# Patient Record
Sex: FEMALE | Race: WHITE | NOT HISPANIC OR LATINO | URBAN - METROPOLITAN AREA
[De-identification: names, ages, dates, MRNs, and addresses within clinical notes are randomized per-mention and may not be internally consistent; named-entity substitution may affect disease eponyms.]

---

## 2023-06-23 ASSESSMENT — ENCOUNTER SYMPTOMS
SORE THROAT: 0
NECK PAIN: 1
DIARRHEA: 0
VOMITING: 0
RHINORRHEA: 1
ABDOMINAL PAIN: 0
COUGH: 1
HEADACHES: 1

## 2023-06-25 PROBLEM — F41.9 ANXIETY: Status: ACTIVE | Noted: 2023-06-25

## 2023-06-25 PROBLEM — H92.09 OTALGIA, UNSPECIFIED EAR: Status: ACTIVE | Noted: 2023-06-25

## 2023-06-25 PROBLEM — N20.0 NEPHROLITHIASIS: Status: ACTIVE | Noted: 2023-06-25

## 2023-06-25 PROBLEM — J31.0 CHRONIC RHINITIS: Status: ACTIVE | Noted: 2023-06-25

## 2023-06-25 PROBLEM — R73.03 PREDIABETES: Status: ACTIVE | Noted: 2023-06-25

## 2023-06-25 PROBLEM — I10 HYPERTENSION, ESSENTIAL: Status: ACTIVE | Noted: 2023-06-25

## 2023-06-25 PROBLEM — F41.8 DEPRESSION WITH ANXIETY: Status: ACTIVE | Noted: 2023-06-25

## 2023-06-25 PROBLEM — R60.0 EDEMA OF BOTH LEGS: Status: ACTIVE | Noted: 2023-06-25

## 2023-06-25 PROBLEM — E78.00 HYPERCHOLESTEROLEMIA: Status: ACTIVE | Noted: 2023-06-25

## 2023-06-25 RX ORDER — HYDROCHLOROTHIAZIDE 12.5 MG/1
12.5 CAPSULE ORAL EVERY MORNING
COMMUNITY
Start: 2023-04-06

## 2023-06-25 RX ORDER — ALBUTEROL SULFATE 90 UG/1
2 AEROSOL, METERED RESPIRATORY (INHALATION) EVERY 6 HOURS PRN
COMMUNITY
Start: 2023-03-30 | End: 2023-08-31 | Stop reason: ALTCHOICE

## 2023-06-25 RX ORDER — CHOLECALCIFEROL (VITAMIN D3) 50 MCG
2000 TABLET ORAL DAILY
COMMUNITY
Start: 2013-07-31

## 2023-06-25 RX ORDER — CETIRIZINE HYDROCHLORIDE 10 MG/1
10 TABLET ORAL DAILY
COMMUNITY
Start: 2021-09-01 | End: 2024-03-20 | Stop reason: ALTCHOICE

## 2023-06-25 RX ORDER — ESCITALOPRAM OXALATE 5 MG/1
5 TABLET ORAL DAILY
COMMUNITY
Start: 2022-09-09 | End: 2023-06-26 | Stop reason: ALTCHOICE

## 2023-06-25 ASSESSMENT — ENCOUNTER SYMPTOMS
VOMITING: 0
RHINORRHEA: 1
SORE THROAT: 0
DIARRHEA: 0
COUGH: 1
HEADACHES: 1
ABDOMINAL PAIN: 0
NECK PAIN: 1

## 2023-06-25 NOTE — PROGRESS NOTES
Subjective   Patient ID: Amanda Garcia is a 77 y.o. female who presents for right ear pain    Earache   There is pain in both ears. This is a new problem. The current episode started more than 1 month ago. The problem has been waxing and waning. There has been no fever. The pain is at a severity of 8/10. Associated symptoms include coughing, headaches, neck pain and rhinorrhea. Pertinent negatives include no abdominal pain, diarrhea, ear discharge, hearing loss, rash, sore throat or vomiting.   The patient reports a history of constant aching pain in the right ear region over the past several months.  She reports constant itching and swelling in the external part of the right ear as well.  She reports no exacerbating or relieving factors.  She reports that soon after the onset of ear pain she developed constant soreness along the right side of the neck.  She reports an increase in the intensity of the soreness with any movement.  She also reports that soon after she developed the constant aching pain in the right ear she developed intermittent episodes of aching pain in the right frontotemporal region.  She reports that the duration of each episode has been hours.  She reports no precipitating, exacerbating, relieving factors.  She reports no associated visual changes, slurred speech, focal weakness/paresthesias, nausea, vomiting, photophobia, photophobia.    The patient does report a history of clear rhinorrhea from the right nostril over the past several months.  She also reports a history of intermittent momentary episodes of a sensation of being off balance over the past several months.  She reports that the episodes occur when she moves quickly from lying to sitting or sitting to standing she reports no other associated symptoms.    The patient reports no change in the aforementioned symptoms despite completing a 5-day course of doxycycline last week which had been prescribed by a nurse practitioner at an  urgent care center    Review of Systems   HENT:  Positive for ear pain and rhinorrhea. Negative for ear discharge, hearing loss and sore throat.    Respiratory:  Positive for cough.    Gastrointestinal:  Negative for abdominal pain, diarrhea and vomiting.   Musculoskeletal:  Positive for neck pain.   Skin:  Negative for rash.   Neurological:  Positive for headaches.       Objective   There were no vitals taken for this visit.    Physical Exam  Head-palpation revealed no tenderness over the maxillary or frontal sinuses  Eyes-extraocular movements intact pupils equal and reactive to light fundi revealed good retinal color no hemorrhages or exudates  Ears-palpation ofpinnas and tragusesrevealed no tenderness. External auditory canals not erythematous or swollen. TMs clear.      Nose-turbinates not erythematous or swollen no septal deviation noted  Mouth-posterior pharynx is not erythematous or swollen. Tonsillar pillars appeared normal no exudates  Neck no lymphadenopathy. Thyroid gland not enlarged. , No bruits  Lungs-clear to auscultation bilaterally  Cardiac-rate normal rhythm regular no murmurs no JVD  Extremities-no peripheral edema  Musculoskeletal  Cervical spine-no erythema or swelling.  Full range of motion with increased intensity of soreness in all directions of motion.  Palpation did reveal tenderness just below the right TMJ, no increase in warmth  Neurologic  Mental status-alert and oriented x3   Cranial nerves-2 through 12 grossly intact, no visual field abnormalities  Motor-no pronator drift noted, strength-5/5 in all muscle groups tested, , no tremor noted.  No bradykinesia noted.  No rigidity noted.  Negative pull test  Sensory-Light touch sensation fully intact  Pinprick sensation fully intact  Vibratory sensation fully intact  Cerebellar-no truncal ataxia, good coordination finger-nose testing,, good coordination heel-to-shin testing, normal rapid alternating movements  Romberg negative, no  abnormality in tandem gait  Reflexes-1+/4 bilaterally    [place]-Hallpike negative with rotation of the head bilaterally  Assessment/Plan        Assessment  Constant aching pain in the right ear-May be secondary to right TMJ syndrome, osteoarthritis and degenerative disc disease of the cervical spine, eustachian tube dysfunction.  Rhinorrhea from the right nostril-May be secondary to nonallergic rhinitis, allergic rhinitis, structural abnormality  Frequent episodes of aching pain in the right frontotemporal region-May be secondary to TMJ syndrome, osteoarthritis and degenerative disc disease of the cervical spine  Constant soreness located along the right side of the neck-may be secondary to osteoarthritis and degenerative disc disease of the cervical spine  Plan  Obtain MRI/MRI of the brain and internal auditory canals as soon as possible.  Obtain MRI of the cervical spine as soon as possible.  I did encourage the patient to use Flonase spray 1 spray into the right nostril twice daily and to continue use of Zyrtec 10 mg p.o. nightly.  I also recommended that she begin use of celecoxib 100 mg p.o. twice daily as needed pain.  The patient may require an ENT referral and/or physical therapy referral

## 2023-06-26 ENCOUNTER — OFFICE VISIT (OUTPATIENT)
Dept: PRIMARY CARE | Facility: CLINIC | Age: 78
End: 2023-06-26
Payer: MEDICARE

## 2023-06-26 VITALS
BODY MASS INDEX: 23.44 KG/M2 | WEIGHT: 139.8 LBS | SYSTOLIC BLOOD PRESSURE: 130 MMHG | HEART RATE: 96 BPM | DIASTOLIC BLOOD PRESSURE: 76 MMHG

## 2023-06-26 DIAGNOSIS — H92.01 RIGHT EAR PAIN: Primary | ICD-10-CM

## 2023-06-26 DIAGNOSIS — M54.2 NECK PAIN: ICD-10-CM

## 2023-06-26 PROCEDURE — 3075F SYST BP GE 130 - 139MM HG: CPT | Performed by: INTERNAL MEDICINE

## 2023-06-26 PROCEDURE — 1159F MED LIST DOCD IN RCRD: CPT | Performed by: INTERNAL MEDICINE

## 2023-06-26 PROCEDURE — 3078F DIAST BP <80 MM HG: CPT | Performed by: INTERNAL MEDICINE

## 2023-06-26 PROCEDURE — 99213 OFFICE O/P EST LOW 20 MIN: CPT | Performed by: INTERNAL MEDICINE

## 2023-06-26 PROCEDURE — 1160F RVW MEDS BY RX/DR IN RCRD: CPT | Performed by: INTERNAL MEDICINE

## 2023-06-26 RX ORDER — CELECOXIB 100 MG/1
100 CAPSULE ORAL 2 TIMES DAILY
Qty: 60 CAPSULE | Refills: 2 | Status: SHIPPED | OUTPATIENT
Start: 2023-06-26 | End: 2023-08-31 | Stop reason: ALTCHOICE

## 2023-06-26 RX ORDER — CELECOXIB 100 MG/1
100 CAPSULE ORAL 2 TIMES DAILY
COMMUNITY
End: 2023-06-26 | Stop reason: SDUPTHER

## 2023-09-01 NOTE — PROGRESS NOTES
Subjective   Reason for Visit: Amanda Garcia is an 77 y.o. female here for a Medicare Wellness visit.               HPI  The patient reports a history of frequent episodes of soreness extending from the midportion of the right upper arm to the proximal end of the right forearm over the past 2 months.  She reports that the episodes are precipitated by increased use of the arm.  She reports no preceding trauma/overexertion.  No other associated symptoms.    Over the past year, the patient reports experiencing dyspnea with more activities.  Not only does she experience dyspnea when walking up and down stairs but also when cleaning.  She still reports continued chronic intermittent brief episodes of aching pain in the left upper chest region times years.  She reports that the episodes occur during periods of stress and are a few minutes in duration.  She reports that she does not experience the episodes with activity.  She reports that the episodes have not been affected by oral intake or movement.  She reports no radiation of discomfort and no other associated symptoms.  She reports no change in the frequency, duration, or intensity of the episodes over the past year    The patient reports a long history of intermittent episodes of a nonproductive cough.  She reports a long history of intermittent episodes of postnasal drip times years.  She reports that the cough often can occur in the absence of postnasal drip.  She reports no other associated symptoms.    She reports no episodes of nausea since she stopped drinking coffee approximately 1 year ago.    Over the past year, she reports experiencing intermittent episodes of soreness in the left groin region only when she sits  style.  She reports no radiation of discomfort.  She reports no other associated symptoms.  She reports no episodes of pain on the right side of the neck, no episodes of pain in the right frontal temporal region, and no episodes of pain in  the right ear over the past 1-1.5 months since she has been performing exercises given to her in physical therapy.    The patient does report that her energy level was good up until the past few weeks.  He has felt more fatigued over the past few weeks.  She reports no depressed mood, no anxiety, no irritability, no decrease in concentration over the past year or so.  No other new complaints.  Patient Care Team:  Scotty Flores MD as PCP - General  Scotty Flores MD as PCP - [facility] Attributed Provider     Review of Systems  All systems have been reviewed and are normal except as previously noted  Objective   Vitals:  There were no vitals taken for this visit.      Physical Exam  Head - palpation revealed no tenderness over the maxillary or frontal sinuses.   Eyes - extraocular movements intact, pupils equal and reactive to light, fundi revealed good retinal color, no hemorrhages or exudates  Ears -palpation of the pinnas and traguses revealed no tenderness. External auditory canals are narrow but not erythematous or swollen. TMs clear  Nose - turbinates not erythematous or swollen; no septal deviation noted.  Mouth - posterior pharynx is not erythematous or swollen. Tonsillar pillars appeared normal; no exudates.  Neck - no lymphadenopathy. Thyroid gland not enlarged. No bruits.  Breasts - Deferred. Mammogram performed August 23, 2023  Lungs - clear to auscultation bilaterally.  Cardiac - rate normal, rhythm regular, no murmurs, no JVD.  Abdomen - cholecystectomy scar noted. Soft, nondistended, normal active bowel sounds. Palpation revealed no tenderness or masses.  Pulses - 2+ bilaterally except dorsalis pedis pulses in both feet-0  Extremities - no peripheral edema.  Musculoskeletal:  Chest - No erythema or swelling. Full range of motion in all directions of motion with no pain. Palpation revealed no tenderness or increase in warmth.  Right shoulder-no erythema or swelling.?  Positive Miguel sign.  Full range of  motion in all directions of motion with no pain.  Palpation revealed no tenderness or increase in warmth.  Slightly positive Neer's sign, slightly positive impingement sign, negative Arnett sign, negative arm crossover test, negative empty can test, negative Yergason sign  Left hip-no erythema or swelling. Windshield wiper test negative. Full range of motion in all directions of motion with no pain. Palpation revealed no tenderness or increase in warmth    Neurologic  Mental status-alert and oriented x3   Cranial nerves-2 through 12 grossly intact, no visual field abnormalities  Motor-no pronator drift noted, strength-5/5 in all muscle groups tested, , no tremor noted.  No bradykinesia noted.  No rigidity noted.  Negative pull test  Sensory-Light touch sensation fully intact  Pinprick sensation fully intact  Vibratory sensation fully intact  Cerebellar-no truncal ataxia, good coordination finger-nose testing,, good coordination heel-to-shin testing, normal rapid alternating movements  Romberg negative, no abnormality in tandem gait  Reflexes -ankle jerks 1+/4 bilaterally; left upper extremity 1+/4, all other reflexes tested 2+/4 bilaterally  Assessment/Plan   Problem List Items Addressed This Visit    None    Assessment.  Chronic intermittent episodes of aching pain in the left upper chest region-may be secondary to anxiety, neuromuscular chest discomfort  Noncardiac chest pain  Chronic intermittent episodes of bilateral LE edema - unsure of etiology. May be secondary to lymphedema  Atherosclerotic calcifications in the aorta  Hypertension- blood pressure at goal today.  2 mm nodule left lower lobe..  Chronic dyspnea on exertion-occurring with more activities over the past year-May be secondary to lack of conditioning.  Systolic and/or diastolic dysfunction I suppose are possible etiologies.  Obstructive lung disease is a possible etiology.  COVID-19 September 12, 2022.  Long history of nonproductive  cough-intermittent-unsure of etiology.  May be secondary to upper airway cough syndrome secondary to allergic rhinitis,  Long history of intermittent episodes of postnasal drip-May be secondary to structural abnormality, allergic rhinitis  Gastroesophageal reflux.  Chronic variability in the frequency of bowel movements-probably secondary to IBS-C  Chronic variability in consistency and shape of stools, increased passage of flatus after eating certain foods-may be secondary to IBS-C  Diverticulosis.  Hemorrhoids  Status post cholecystectomy-remote  Nephrolithiasis-recurrent.  Hypercalciuria  Chronic intermittent episodes of urinary urgency-may be secondary to overactive bladder  Overactive bladder  Status post total abdominal hysterectomy/bilateral salpingo-oophorectomy.  Pelvic organ prolapse  Fracture of right second toe, fracture of left third toe-remote frequent episodes of soreness extending from the midportion of the right upper arm to the proximal end of the right forearm-May be secondary to biceps tendon tear.  Intermittent episodes of soreness in the left groin region-May be secondary to muscle strain, osteoarthritis of the left hip  Plantar fasciitis right heel  Shinsplints right lower leg.  Iritis left eye-recurrent  Bilateral cataracts  Vitreous degeneration L eye.   Dry eye syndrome  Benign keratosis superior end of the left pinna-  Migraine headaches.  Small vessel ischemic changes  Mild central canal stenosis and mild bilateral neuroforaminal stenosis L4-L5  Right-sided neuroforaminal stenosis C6-C7.  Osteoarthritis and degenerative disc disease of the cervical spine  Recent history of generalized fatigue-unsure of etiology.  Anemia and thyroid disease must be ruled out.  Chronic anorexia-probably secondary to depression and anxiety  Depression  Anxiety        Plan  Obtain EKG, urinalysis, CBC differential, CMP, cardiac CRP, fasting lipid profile, vitamin D level, vitamin B 12 level, hemoglobin A1c,  TSH today..  Obtain pulse ox on room air and chest x-ray as well today.  I have recommended that the patient receive 2 doses of Shingrix over the next 12 months.  I asked the patient to begin use of Tylenol 1000 mg p.o. every 6 hours as needed for the moment her episodes of tightness in the central chest region.  I told the patient that she should continue use of Zyrtec but also should use saline spray 2 sprays to each nostril 2-3 times per day.  I told the patient to begin align 4 mg p.o. daily    Patient will return for an annual Medicare wellness visit in 1 year

## 2023-09-05 ENCOUNTER — OFFICE VISIT (OUTPATIENT)
Dept: PRIMARY CARE | Facility: CLINIC | Age: 78
End: 2023-09-05
Payer: MEDICARE

## 2023-09-05 ENCOUNTER — LAB (OUTPATIENT)
Dept: LAB | Facility: LAB | Age: 78
End: 2023-09-05
Payer: MEDICARE

## 2023-09-05 VITALS
HEART RATE: 74 BPM | BODY MASS INDEX: 23.48 KG/M2 | SYSTOLIC BLOOD PRESSURE: 98 MMHG | WEIGHT: 140 LBS | DIASTOLIC BLOOD PRESSURE: 68 MMHG

## 2023-09-05 DIAGNOSIS — E55.9 VITAMIN D DEFICIENCY: ICD-10-CM

## 2023-09-05 DIAGNOSIS — E78.00 HYPERCHOLESTEROLEMIA: ICD-10-CM

## 2023-09-05 DIAGNOSIS — M85.851 OSTEOPENIA OF FEMORAL NECK, BILATERAL: ICD-10-CM

## 2023-09-05 DIAGNOSIS — R73.03 PREDIABETES: ICD-10-CM

## 2023-09-05 DIAGNOSIS — I10 HYPERTENSION, ESSENTIAL: ICD-10-CM

## 2023-09-05 DIAGNOSIS — N20.0 NEPHROLITHIASIS: ICD-10-CM

## 2023-09-05 DIAGNOSIS — J31.0 CHRONIC RHINITIS: ICD-10-CM

## 2023-09-05 DIAGNOSIS — J31.0 CHRONIC RHINITIS: Primary | ICD-10-CM

## 2023-09-05 DIAGNOSIS — R06.09 DOE (DYSPNEA ON EXERTION): ICD-10-CM

## 2023-09-05 DIAGNOSIS — M85.852 OSTEOPENIA OF FEMORAL NECK, BILATERAL: ICD-10-CM

## 2023-09-05 DIAGNOSIS — Z00.00 ROUTINE GENERAL MEDICAL EXAMINATION AT HEALTH CARE FACILITY: ICD-10-CM

## 2023-09-05 LAB
ALANINE AMINOTRANSFERASE (SGPT) (U/L) IN SER/PLAS: 13 U/L (ref 7–45)
ALBUMIN (G/DL) IN SER/PLAS: 4.4 G/DL (ref 3.4–5)
ALKALINE PHOSPHATASE (U/L) IN SER/PLAS: 66 U/L (ref 33–136)
ANION GAP IN SER/PLAS: 14 MMOL/L (ref 10–20)
ASPARTATE AMINOTRANSFERASE (SGOT) (U/L) IN SER/PLAS: 22 U/L (ref 9–39)
BASOPHILS (10*3/UL) IN BLOOD BY AUTOMATED COUNT: 0.06 X10E9/L (ref 0–0.1)
BASOPHILS/100 LEUKOCYTES IN BLOOD BY AUTOMATED COUNT: 0.9 % (ref 0–2)
BILIRUBIN TOTAL (MG/DL) IN SER/PLAS: 0.7 MG/DL (ref 0–1.2)
C REACTIVE PROTEIN (MG/L) IN SER/PLAS BY HIGH SENSIT: 2.3 MG/L
CALCIDIOL (25 OH VITAMIN D3) (NG/ML) IN SER/PLAS: 53 NG/ML
CALCIUM (MG/DL) IN SER/PLAS: 10 MG/DL (ref 8.6–10.6)
CARBON DIOXIDE, TOTAL (MMOL/L) IN SER/PLAS: 27 MMOL/L (ref 21–32)
CHLORIDE (MMOL/L) IN SER/PLAS: 104 MMOL/L (ref 98–107)
CHOLESTEROL (MG/DL) IN SER/PLAS: 185 MG/DL (ref 0–199)
CHOLESTEROL IN HDL (MG/DL) IN SER/PLAS: 63.9 MG/DL
CHOLESTEROL/HDL RATIO: 2.9
COBALAMIN (VITAMIN B12) (PG/ML) IN SER/PLAS: 419 PG/ML (ref 211–911)
CREATININE (MG/DL) IN SER/PLAS: 0.68 MG/DL (ref 0.5–1.05)
EOSINOPHILS (10*3/UL) IN BLOOD BY AUTOMATED COUNT: 0.15 X10E9/L (ref 0–0.4)
EOSINOPHILS/100 LEUKOCYTES IN BLOOD BY AUTOMATED COUNT: 2.3 % (ref 0–6)
ERYTHROCYTE DISTRIBUTION WIDTH (RATIO) BY AUTOMATED COUNT: 11.8 % (ref 11.5–14.5)
ERYTHROCYTE MEAN CORPUSCULAR HEMOGLOBIN CONCENTRATION (G/DL) BY AUTOMATED: 34.5 G/DL (ref 32–36)
ERYTHROCYTE MEAN CORPUSCULAR VOLUME (FL) BY AUTOMATED COUNT: 98 FL (ref 80–100)
ERYTHROCYTES (10*6/UL) IN BLOOD BY AUTOMATED COUNT: 4.45 X10E12/L (ref 4–5.2)
GFR FEMALE: 90 ML/MIN/1.73M2
GLUCOSE (MG/DL) IN SER/PLAS: 95 MG/DL (ref 74–99)
HEMATOCRIT (%) IN BLOOD BY AUTOMATED COUNT: 43.5 % (ref 36–46)
HEMOGLOBIN (G/DL) IN BLOOD: 15 G/DL (ref 12–16)
IMMATURE GRANULOCYTES/100 LEUKOCYTES IN BLOOD BY AUTOMATED COUNT: 0.2 % (ref 0–0.9)
LDL: 107 MG/DL (ref 0–99)
LEUKOCYTES (10*3/UL) IN BLOOD BY AUTOMATED COUNT: 6.4 X10E9/L (ref 4.4–11.3)
LYMPHOCYTES (10*3/UL) IN BLOOD BY AUTOMATED COUNT: 3.32 X10E9/L (ref 0.8–3)
LYMPHOCYTES/100 LEUKOCYTES IN BLOOD BY AUTOMATED COUNT: 51.8 % (ref 13–44)
MONOCYTES (10*3/UL) IN BLOOD BY AUTOMATED COUNT: 0.48 X10E9/L (ref 0.05–0.8)
MONOCYTES/100 LEUKOCYTES IN BLOOD BY AUTOMATED COUNT: 7.5 % (ref 2–10)
NEUTROPHILS (10*3/UL) IN BLOOD BY AUTOMATED COUNT: 2.39 X10E9/L (ref 1.6–5.5)
NEUTROPHILS/100 LEUKOCYTES IN BLOOD BY AUTOMATED COUNT: 37.3 % (ref 40–80)
NRBC (PER 100 WBCS) BY AUTOMATED COUNT: 0 /100 WBC (ref 0–0)
PLATELETS (10*3/UL) IN BLOOD AUTOMATED COUNT: 257 X10E9/L (ref 150–450)
POTASSIUM (MMOL/L) IN SER/PLAS: 3.9 MMOL/L (ref 3.5–5.3)
PROTEIN TOTAL: 7 G/DL (ref 6.4–8.2)
SODIUM (MMOL/L) IN SER/PLAS: 141 MMOL/L (ref 136–145)
THYROTROPIN (MIU/L) IN SER/PLAS BY DETECTION LIMIT <= 0.05 MIU/L: 0.97 MIU/L (ref 0.44–3.98)
TRIGLYCERIDE (MG/DL) IN SER/PLAS: 73 MG/DL (ref 0–149)
UREA NITROGEN (MG/DL) IN SER/PLAS: 16 MG/DL (ref 6–23)
VLDL: 15 MG/DL (ref 0–40)

## 2023-09-05 PROCEDURE — 1159F MED LIST DOCD IN RCRD: CPT | Performed by: INTERNAL MEDICINE

## 2023-09-05 PROCEDURE — 36415 COLL VENOUS BLD VENIPUNCTURE: CPT

## 2023-09-05 PROCEDURE — 86141 C-REACTIVE PROTEIN HS: CPT

## 2023-09-05 PROCEDURE — 84443 ASSAY THYROID STIM HORMONE: CPT

## 2023-09-05 PROCEDURE — 3078F DIAST BP <80 MM HG: CPT | Performed by: INTERNAL MEDICINE

## 2023-09-05 PROCEDURE — 82306 VITAMIN D 25 HYDROXY: CPT

## 2023-09-05 PROCEDURE — 82607 VITAMIN B-12: CPT

## 2023-09-05 PROCEDURE — 1160F RVW MEDS BY RX/DR IN RCRD: CPT | Performed by: INTERNAL MEDICINE

## 2023-09-05 PROCEDURE — 85025 COMPLETE CBC W/AUTO DIFF WBC: CPT

## 2023-09-05 PROCEDURE — 1170F FXNL STATUS ASSESSED: CPT | Performed by: INTERNAL MEDICINE

## 2023-09-05 PROCEDURE — 80061 LIPID PANEL: CPT

## 2023-09-05 PROCEDURE — 3074F SYST BP LT 130 MM HG: CPT | Performed by: INTERNAL MEDICINE

## 2023-09-05 PROCEDURE — 80053 COMPREHEN METABOLIC PANEL: CPT

## 2023-09-05 PROCEDURE — 99215 OFFICE O/P EST HI 40 MIN: CPT | Performed by: INTERNAL MEDICINE

## 2023-09-05 ASSESSMENT — ACTIVITIES OF DAILY LIVING (ADL)
DOING_HOUSEWORK: INDEPENDENT
MANAGING_FINANCES: INDEPENDENT
BATHING: INDEPENDENT
GROCERY_SHOPPING: INDEPENDENT
DRESSING: INDEPENDENT
TAKING_MEDICATION: INDEPENDENT

## 2023-09-05 ASSESSMENT — ENCOUNTER SYMPTOMS
LOSS OF SENSATION IN FEET: 0
DEPRESSION: 0
OCCASIONAL FEELINGS OF UNSTEADINESS: 0

## 2023-09-07 DIAGNOSIS — R06.09 DOE (DYSPNEA ON EXERTION): Primary | ICD-10-CM

## 2023-09-07 DIAGNOSIS — D12.6 ADENOMATOUS POLYP OF COLON, UNSPECIFIED PART OF COLON: Primary | ICD-10-CM

## 2023-09-09 ENCOUNTER — TELEMEDICINE (OUTPATIENT)
Dept: PRIMARY CARE | Facility: CLINIC | Age: 78
End: 2023-09-09
Payer: MEDICARE

## 2023-09-09 DIAGNOSIS — R05.1 ACUTE COUGH: ICD-10-CM

## 2023-09-09 DIAGNOSIS — U07.1 COVID: Primary | ICD-10-CM

## 2023-09-09 PROCEDURE — 99443 PR PHYS/QHP TELEPHONE EVALUATION 21-30 MIN: CPT | Performed by: INTERNAL MEDICINE

## 2023-09-10 DIAGNOSIS — U07.1 COVID: Primary | ICD-10-CM

## 2023-09-11 NOTE — PROGRESS NOTES
Patient called answering service complaining of fever, chills, fatigue.  COVID test came positive today.  She was exposed to COVID.  History of hypertension and takes hydrochlorothiazide.  Plan  Prescribed Paxlovid-take as prescribed  Take NSAIDs, decongestants, plenty of fluids and rest  Follow-up with PCP Dr. Flores

## 2023-12-14 ENCOUNTER — OFFICE VISIT (OUTPATIENT)
Dept: GASTROENTEROLOGY | Facility: EXTERNAL LOCATION | Age: 78
End: 2023-12-14
Payer: MEDICARE

## 2023-12-14 DIAGNOSIS — Z86.010 PERSONAL HISTORY OF COLONIC POLYPS: Primary | ICD-10-CM

## 2023-12-14 DIAGNOSIS — Z12.11 ENCOUNTER FOR SCREENING FOR MALIGNANT NEOPLASM OF COLON: ICD-10-CM

## 2023-12-14 PROCEDURE — 1160F RVW MEDS BY RX/DR IN RCRD: CPT | Performed by: INTERNAL MEDICINE

## 2023-12-14 PROCEDURE — G0105 COLORECTAL SCRN; HI RISK IND: HCPCS | Performed by: INTERNAL MEDICINE

## 2023-12-14 PROCEDURE — 1159F MED LIST DOCD IN RCRD: CPT | Performed by: INTERNAL MEDICINE

## 2023-12-18 ENCOUNTER — ANCILLARY PROCEDURE (OUTPATIENT)
Dept: RADIOLOGY | Facility: CLINIC | Age: 78
End: 2023-12-18
Payer: MEDICARE

## 2023-12-18 DIAGNOSIS — M85.851 OTHER SPECIFIED DISORDERS OF BONE DENSITY AND STRUCTURE, RIGHT THIGH: ICD-10-CM

## 2023-12-18 DIAGNOSIS — M85.852 OTHER SPECIFIED DISORDERS OF BONE DENSITY AND STRUCTURE, LEFT THIGH: ICD-10-CM

## 2023-12-18 DIAGNOSIS — E55.9 VITAMIN D DEFICIENCY, UNSPECIFIED: ICD-10-CM

## 2023-12-18 PROCEDURE — 77080 DXA BONE DENSITY AXIAL: CPT | Performed by: RADIOLOGY

## 2023-12-18 PROCEDURE — 77080 DXA BONE DENSITY AXIAL: CPT

## 2023-12-27 NOTE — PROGRESS NOTES
Subjective   Patient ID: Amanda Garcia is a 78 y.o. female who presents for     HPI   The patient reports a history of constant burning pain originating over the ventral surface of the distal end of the left forearm since she woke up on December 26.  She reports a significant increase in the intensity of the pain with any movement of the arm.  She reports that with any movement of the arm she will experience radiation of pain to the left shoulder.  She also reports intermittent episodes of tingling in the left hand as well occurring with any use of the hand.  She reports associated weakness in the left hand.  She reports no preceding trauma/overexertion.  She reports no neck pain, visual changes, slurred speech,    The patient initially presented to an urgent care center the morning of December 26 and then was told to go to the emergency department.  She presented to the [facility] emergency department in the early afternoon hours.  She reports that blood work was drawn and a chest x-ray was obtained.  The patient was told that she was to be admitted..  The patient still had not been seen and evaluated by 11:30 at night and as a result she left.  Since leaving the emergency department the evening of December 26, she reports no significant change in the above symptoms.  Review of Systems    Objective   There were no vitals taken for this visit.    Physical Exam  Pulses-upper extremities 2+ bilaterally equally  Musculoskeletal  Left wrist-no erythema or swelling.  Full range of motion with increased intensity of pain in all directions of motion-less so with supination.  Palpation of the ventral surface distal end of the forearm revealed increased tenderness but no increase in warmth.  Neurologic  Upper extremities:  Motor-strength left wrist flexors 4/5, 5/5 in all other muscle groups tested  Sensory  Light touch and pinprick sensation fully intact  Reflexes-2+/4 bilaterally  Assessment/Plan         Assessment  Constant burning pain located over the the ventral surface of the distal end of the left forearm with intermittent radiation of pain to the left shoulder with associated intermittent episodes of tingling in the left hand and weakness in the left hand-unsure of etiology.  May be secondary to compression neuropathy, tendinitis.  Plan  Begin Medrol Dosepak as directed  I told the patient to apply Voltaren gel to the ventral surface of the distal end of the left forearm every 6 hours as needed.  The patient will return for a follow-up visit in 6 days.  She will call me if symptoms worsen or if she develops additional symptoms.

## 2023-12-28 ENCOUNTER — OFFICE VISIT (OUTPATIENT)
Dept: PRIMARY CARE | Facility: CLINIC | Age: 78
End: 2023-12-28
Payer: MEDICARE

## 2023-12-28 VITALS
SYSTOLIC BLOOD PRESSURE: 168 MMHG | WEIGHT: 135 LBS | DIASTOLIC BLOOD PRESSURE: 96 MMHG | HEART RATE: 92 BPM | BODY MASS INDEX: 22.64 KG/M2

## 2023-12-28 DIAGNOSIS — R20.2 PARESTHESIAS: Primary | ICD-10-CM

## 2023-12-28 PROCEDURE — 1160F RVW MEDS BY RX/DR IN RCRD: CPT | Performed by: INTERNAL MEDICINE

## 2023-12-28 PROCEDURE — 1159F MED LIST DOCD IN RCRD: CPT | Performed by: INTERNAL MEDICINE

## 2023-12-28 PROCEDURE — 99213 OFFICE O/P EST LOW 20 MIN: CPT | Performed by: INTERNAL MEDICINE

## 2023-12-28 PROCEDURE — 3080F DIAST BP >= 90 MM HG: CPT | Performed by: INTERNAL MEDICINE

## 2023-12-28 PROCEDURE — 3077F SYST BP >= 140 MM HG: CPT | Performed by: INTERNAL MEDICINE

## 2023-12-28 RX ORDER — PYRIDOXINE HCL (VITAMIN B6) 100 MG
100 TABLET ORAL DAILY
COMMUNITY

## 2023-12-28 RX ORDER — METHYLPREDNISOLONE 4 MG/1
TABLET ORAL
Qty: 21 TABLET | Refills: 0 | Status: SHIPPED | OUTPATIENT
Start: 2023-12-28 | End: 2024-01-04

## 2023-12-31 NOTE — PROGRESS NOTES
"Subjective   Patient ID: Amanda Garcia is a 78 y.o. female who presents for follow-up visit.    HPI   Since day 4-5 of the patient's Medrol Dosepak, she has noted only intermittent episodes of mild aching pain over the ventral surface of the distal end of the left forearm.  She reports that the episodes of pain have been precipitated by use of the left hand.  She does report intermittent radiation of pain to the antecubital fossa, not the shoulder.  Since day 4-5 of the patient's Medrol Dosepak, she reports no episodes of tingling in the left hand and no weakness in the left hand.  Of note, the patient reports that she during the time that she was taking the Medrol Dosepak, she \"did not feel right\".  She noted that her blood glucose level at that time was 270..  Since her last office visit, she does report that her blood pressures have been in the 120/60 range at home  Review of Systems    Objective   There were no vitals taken for this visit.    Physical Exam  Pulses-upper extremities 2+ bilaterally equally  Musculoskeletal  Left wrist-no erythema or swelling.  Full range of motion with pain noted on flexion.  Full range of motion with no pain noted in all other directions of motion.  Palpation revealed no tenderness or increase in warmth.   Neurologic  Upper extremities:  Motor-strength 5/5 in all muscle groups tested  Sensory  Light touch and pinprick sensation fully intact  Reflexes-2+/4 bilaterally  Assessment/Plan         Assessment  Hypertension-diastolic blood pressure not at goal.  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 every 8-12 hours as needed.  She will " continue all of her other current medications and supplements and she will return for her regularly scheduled Medicare wellness visit in September 2024

## 2024-01-04 ENCOUNTER — OFFICE VISIT (OUTPATIENT)
Dept: PRIMARY CARE | Facility: CLINIC | Age: 79
End: 2024-01-04
Payer: MEDICARE

## 2024-01-04 VITALS
HEART RATE: 88 BPM | WEIGHT: 137.4 LBS | SYSTOLIC BLOOD PRESSURE: 126 MMHG | BODY MASS INDEX: 23.04 KG/M2 | DIASTOLIC BLOOD PRESSURE: 94 MMHG

## 2024-01-04 DIAGNOSIS — E09.9 STEROID-INDUCED DIABETES MELLITUS, INITIAL ENCOUNTER (MULTI): ICD-10-CM

## 2024-01-04 DIAGNOSIS — T38.0X5A STEROID-INDUCED DIABETES MELLITUS, INITIAL ENCOUNTER (MULTI): ICD-10-CM

## 2024-01-04 DIAGNOSIS — M25.532 LEFT WRIST PAIN: Primary | ICD-10-CM

## 2024-01-04 PROCEDURE — 3080F DIAST BP >= 90 MM HG: CPT | Performed by: INTERNAL MEDICINE

## 2024-01-04 PROCEDURE — 3074F SYST BP LT 130 MM HG: CPT | Performed by: INTERNAL MEDICINE

## 2024-01-04 PROCEDURE — 99212 OFFICE O/P EST SF 10 MIN: CPT | Performed by: INTERNAL MEDICINE

## 2024-03-19 PROBLEM — K31.9 STOMACH PROBLEMS: Status: ACTIVE | Noted: 2024-03-19

## 2024-03-19 NOTE — PROGRESS NOTES
Subjective   Patient ID: Amanda Garcia is a 78 y.o. female who presents for stomach problems    HPI   The patient reports a history of constant soreness throughout the epigastrium x 2 weeks.  She reports an increase in the intensity of the soreness with palpation.  She reports that the soreness has not been affected by oral intake, passage of flatus, defecation.  She also reports a history of frequent belching, frequent hiccuping, increased passage of flatus x 2 weeks.  She reports a history of intermittent mild episodes of nausea x 2 weeks.  She reports that the duration of each episode is a few minutes.  She reports that the episodes have not been affected by oral intake, passage of flatus, defecation.  Over the past week, the patient reports a decrease in the frequency of bowel movements.  She reports a history of a decrease in appetite and generalized fatigue x 2 weeks.  She reports no fever, chills, vomiting, diarrhea, melena, hematochezia, change in urinary habits.  The patient reports that the aforementioned symptoms developed approximately 1 week after the patient completed a 5-day course of Tamiflu after testing positive for influenza  in late February 2024  Review of Systems    Objective   There were no vitals taken for this visit.    Physical Exam  Lungs-clear  Cardiac-rate normal, rhythm regular, no murmurs, no JVD  Abdomen-soft, nondistended.  Slightly hypoactive bowel sounds.  Palpation revealed moderate tenderness in the epigastrium and periumbilical regions, no rebound tenderness or masses.  Liver percussed to 9 cm in total span  Assessment/Plan        Assessment  Influenza  Intermittent episodes of mild nausea-unsure of etiology.  May be secondary to viral syndrome, peptic ulcer disease/gastritis, pancreatitis, hepatitis, cholecystitis  Constant soreness throughout the epigastrium-may be secondary to viral syndrome, peptic ulcer disease/gastritis, pancreatitis, hepatitis, cholecystitis-  Frequent  belching, hiccuping, increased passage of flatus-May be secondary to viral syndrome, peptic ulcer disease/gastritis, pancreatitis, hepatitis, cholecystitis  Constipation-May be secondary to viral syndrome, peptic ulcer disease/gastritis, pancreatitis, hepatitis, cholecystitis, flare of IBS-C  Anorexia-May be secondary to viral syndrome, peptic ulcer disease/gastritis, pancreatitis, otitis, cholecystitis,  Generalized fatigue-may be secondary to viral syndrome, peptic ulcer disease/gastritis, pancreatitis, hepatitis, cholecystitis.  Plan  Obtain CBC differential, CMP, amylase, lipase, CRP today.  Begin omeprazole 20 mg p.o. twice daily  Begin MiraLAX 17 g p.o. daily as needed.  The patient may require a GI referral

## 2024-03-20 ENCOUNTER — LAB (OUTPATIENT)
Dept: LAB | Facility: LAB | Age: 79
End: 2024-03-20
Payer: MEDICARE

## 2024-03-20 ENCOUNTER — OFFICE VISIT (OUTPATIENT)
Dept: PRIMARY CARE | Facility: CLINIC | Age: 79
End: 2024-03-20
Payer: MEDICARE

## 2024-03-20 VITALS
HEART RATE: 94 BPM | WEIGHT: 133.6 LBS | SYSTOLIC BLOOD PRESSURE: 110 MMHG | BODY MASS INDEX: 22.4 KG/M2 | DIASTOLIC BLOOD PRESSURE: 80 MMHG

## 2024-03-20 DIAGNOSIS — K31.9 STOMACH PROBLEMS: ICD-10-CM

## 2024-03-20 DIAGNOSIS — K59.00 CONSTIPATION, UNSPECIFIED CONSTIPATION TYPE: ICD-10-CM

## 2024-03-20 DIAGNOSIS — K31.9 STOMACH PROBLEMS: Primary | ICD-10-CM

## 2024-03-20 LAB
ALBUMIN SERPL BCP-MCNC: 4.8 G/DL (ref 3.4–5)
ALP SERPL-CCNC: 68 U/L (ref 33–136)
ALT SERPL W P-5'-P-CCNC: 22 U/L (ref 7–45)
AMYLASE SERPL-CCNC: 25 U/L (ref 29–103)
ANION GAP SERPL CALC-SCNC: 12 MMOL/L (ref 10–20)
AST SERPL W P-5'-P-CCNC: 31 U/L (ref 9–39)
BASOPHILS # BLD AUTO: 0.05 X10*3/UL (ref 0–0.1)
BASOPHILS NFR BLD AUTO: 0.8 %
BILIRUB SERPL-MCNC: 0.8 MG/DL (ref 0–1.2)
BUN SERPL-MCNC: 17 MG/DL (ref 6–23)
CALCIUM SERPL-MCNC: 10.2 MG/DL (ref 8.6–10.6)
CHLORIDE SERPL-SCNC: 102 MMOL/L (ref 98–107)
CO2 SERPL-SCNC: 29 MMOL/L (ref 21–32)
CREAT SERPL-MCNC: 0.58 MG/DL (ref 0.5–1.05)
CRP SERPL-MCNC: 0.26 MG/DL
EGFRCR SERPLBLD CKD-EPI 2021: >90 ML/MIN/1.73M*2
EOSINOPHIL # BLD AUTO: 0.16 X10*3/UL (ref 0–0.4)
EOSINOPHIL NFR BLD AUTO: 2.5 %
ERYTHROCYTE [DISTWIDTH] IN BLOOD BY AUTOMATED COUNT: 11.5 % (ref 11.5–14.5)
GLUCOSE SERPL-MCNC: 91 MG/DL (ref 74–99)
HCT VFR BLD AUTO: 44.4 % (ref 36–46)
HGB BLD-MCNC: 15.2 G/DL (ref 12–16)
IMM GRANULOCYTES # BLD AUTO: 0.01 X10*3/UL (ref 0–0.5)
IMM GRANULOCYTES NFR BLD AUTO: 0.2 % (ref 0–0.9)
LIPASE SERPL-CCNC: 17 U/L (ref 9–82)
LYMPHOCYTES # BLD AUTO: 2.74 X10*3/UL (ref 0.8–3)
LYMPHOCYTES NFR BLD AUTO: 42 %
MCH RBC QN AUTO: 32.6 PG (ref 26–34)
MCHC RBC AUTO-ENTMCNC: 34.2 G/DL (ref 32–36)
MCV RBC AUTO: 95 FL (ref 80–100)
MONOCYTES # BLD AUTO: 0.47 X10*3/UL (ref 0.05–0.8)
MONOCYTES NFR BLD AUTO: 7.2 %
NEUTROPHILS # BLD AUTO: 3.09 X10*3/UL (ref 1.6–5.5)
NEUTROPHILS NFR BLD AUTO: 47.3 %
NRBC BLD-RTO: 0 /100 WBCS (ref 0–0)
PLATELET # BLD AUTO: 228 X10*3/UL (ref 150–450)
POTASSIUM SERPL-SCNC: 4 MMOL/L (ref 3.5–5.3)
PROT SERPL-MCNC: 7.3 G/DL (ref 6.4–8.2)
RBC # BLD AUTO: 4.66 X10*6/UL (ref 4–5.2)
SODIUM SERPL-SCNC: 139 MMOL/L (ref 136–145)
TSH SERPL-ACNC: 1.03 MIU/L (ref 0.44–3.98)
WBC # BLD AUTO: 6.5 X10*3/UL (ref 4.4–11.3)

## 2024-03-20 PROCEDURE — 3074F SYST BP LT 130 MM HG: CPT | Performed by: INTERNAL MEDICINE

## 2024-03-20 PROCEDURE — 80053 COMPREHEN METABOLIC PANEL: CPT

## 2024-03-20 PROCEDURE — 86140 C-REACTIVE PROTEIN: CPT

## 2024-03-20 PROCEDURE — 99213 OFFICE O/P EST LOW 20 MIN: CPT | Performed by: INTERNAL MEDICINE

## 2024-03-20 PROCEDURE — 85025 COMPLETE CBC W/AUTO DIFF WBC: CPT

## 2024-03-20 PROCEDURE — 1160F RVW MEDS BY RX/DR IN RCRD: CPT | Performed by: INTERNAL MEDICINE

## 2024-03-20 PROCEDURE — 82150 ASSAY OF AMYLASE: CPT

## 2024-03-20 PROCEDURE — 83690 ASSAY OF LIPASE: CPT

## 2024-03-20 PROCEDURE — 3078F DIAST BP <80 MM HG: CPT | Performed by: INTERNAL MEDICINE

## 2024-03-20 PROCEDURE — 84443 ASSAY THYROID STIM HORMONE: CPT

## 2024-03-20 PROCEDURE — 1159F MED LIST DOCD IN RCRD: CPT | Performed by: INTERNAL MEDICINE

## 2024-03-20 PROCEDURE — 36415 COLL VENOUS BLD VENIPUNCTURE: CPT

## 2024-06-13 ENCOUNTER — APPOINTMENT (OUTPATIENT)
Dept: GASTROENTEROLOGY | Facility: CLINIC | Age: 79
End: 2024-06-13
Payer: MEDICARE

## 2024-06-13 VITALS — HEART RATE: 88 BPM | BODY MASS INDEX: 22.33 KG/M2 | WEIGHT: 134 LBS | HEIGHT: 65 IN | OXYGEN SATURATION: 97 %

## 2024-06-13 DIAGNOSIS — R10.11 RIGHT UPPER QUADRANT ABDOMINAL PAIN: Primary | ICD-10-CM

## 2024-06-13 PROCEDURE — 1159F MED LIST DOCD IN RCRD: CPT | Performed by: INTERNAL MEDICINE

## 2024-06-13 PROCEDURE — 1036F TOBACCO NON-USER: CPT | Performed by: INTERNAL MEDICINE

## 2024-06-13 PROCEDURE — 99214 OFFICE O/P EST MOD 30 MIN: CPT | Performed by: INTERNAL MEDICINE

## 2024-06-13 RX ORDER — OMEPRAZOLE 20 MG/1
20 TABLET, DELAYED RELEASE ORAL DAILY
Qty: 30 TABLET | Refills: 11 | Status: SHIPPED | OUTPATIENT
Start: 2024-06-13 | End: 2025-06-13

## 2024-06-13 NOTE — PROGRESS NOTES
"Subjective     History of Present Illness:   Amanda Garcia is a 78 y.o. female who presents to GI clinic for:Couple months ago epigastric \"bloating\", nausea, \"water brash\", intermittent episodes.   Took omeprazole for 2 weeks and went away.  Recurred few weeks later and took famotidine with some relief.  Also gets belching, \"just feel miserable.\"  Sometimes with drier foods felt it was sticking at the level of lower throat.  No heartburn. No problems with tomato sauces and no clear pattern of triggers.    Under a lot of stress due to cardiac issues with her significant other.  .    Review of Systems  Review of Systems    Social History   reports that she has never smoked. She has never used smokeless tobacco. She reports that she does not currently use alcohol.     Allergies  Allergies   Allergen Reactions    Penicillins Rash       Medications  Current Outpatient Medications   Medication Instructions    cholecalciferol (VITAMIN D-3) 2,000 Units, oral, Daily    hydroCHLOROthiazide (MICROZIDE) 12.5 mg, Every morning    pyridoxine (VITAMIN B-6) 100 mg, oral, Daily        Objective   Visit Vitals  Pulse 88      Physical Exam  Constitutional:       Appearance: Normal appearance.   HENT:      Mouth/Throat:      Mouth: Mucous membranes are moist.      Pharynx: Oropharynx is clear.   Eyes:      Extraocular Movements: Extraocular movements intact.      Pupils: Pupils are equal, round, and reactive to light.   Cardiovascular:      Rate and Rhythm: Normal rate and regular rhythm.      Heart sounds: No murmur heard.  Pulmonary:      Effort: Pulmonary effort is normal.      Breath sounds: Normal breath sounds.   Abdominal:      General: Abdomen is flat. Bowel sounds are normal.      Palpations: Abdomen is soft. There is no mass.   Skin:     General: Skin is warm and dry.   Neurological:      Mental Status: She is alert.   Psychiatric:         Mood and Affect: Mood normal.         Behavior: Behavior normal.         Thought " Content: Thought content normal.         Judgment: Judgment normal.                     Assessment/Plan   Amanda Garcia is a 78 y.o. female who presents to GI clinic for intermittent, generally post-prandial epigastric and RUQ pressure, sometimes radiates around right side. Despite location, the apparent response to omeprazole suggests more likely acid/peptic not biliary. Less likely neoplasia.    Plan    Omeprazole 20 daily for 1 month  If sx fail to resolve do RUQ ultrasound  If they resolve then recur, do EGD.      Bahman Lee MD

## 2024-09-05 ENCOUNTER — APPOINTMENT (OUTPATIENT)
Dept: GASTROENTEROLOGY | Facility: CLINIC | Age: 79
End: 2024-09-05
Payer: MEDICARE

## 2024-09-05 VITALS — HEART RATE: 65 BPM | OXYGEN SATURATION: 96 % | WEIGHT: 235 LBS | HEIGHT: 65 IN | BODY MASS INDEX: 39.15 KG/M2

## 2024-09-05 DIAGNOSIS — K21.9 GASTROESOPHAGEAL REFLUX DISEASE, UNSPECIFIED WHETHER ESOPHAGITIS PRESENT: ICD-10-CM

## 2024-09-05 DIAGNOSIS — R13.14 PHARYNGOESOPHAGEAL DYSPHAGIA: Primary | ICD-10-CM

## 2024-09-05 PROBLEM — I51.89 DIASTOLIC DYSFUNCTION: Status: ACTIVE | Noted: 2024-09-05

## 2024-09-05 PROCEDURE — 1159F MED LIST DOCD IN RCRD: CPT | Performed by: INTERNAL MEDICINE

## 2024-09-05 PROCEDURE — 1160F RVW MEDS BY RX/DR IN RCRD: CPT | Performed by: INTERNAL MEDICINE

## 2024-09-05 PROCEDURE — 1036F TOBACCO NON-USER: CPT | Performed by: INTERNAL MEDICINE

## 2024-09-05 PROCEDURE — 99214 OFFICE O/P EST MOD 30 MIN: CPT | Performed by: INTERNAL MEDICINE

## 2024-09-05 NOTE — ASSESSMENT & PLAN NOTE
Orders:    CBC and Auto Differential; Future    Comprehensive Metabolic Panel; Future    C-Reactive Protein, High Sensitivity; Future    Hemoglobin A1C; Future    Lipid Panel; Future    Thyroid Stimulating Hormone; Future    Vitamin B12; Future    Vitamin D 25-Hydroxy,Total (for eval of Vitamin D levels); Future    Hepatitis C antibody; Future    empagliflozin (Jardiance) 10 mg; Take 1 tablet (10 mg) by mouth once daily.

## 2024-09-05 NOTE — PROGRESS NOTES
Subjective   Reason for Visit: Amanda Garcia is an 78 y.o. female here for a Medicare Wellness visit.               HPI  The patient reports a history of constant aching pain in the forehead region and retro-orbital regions times a few days.  She reports associated clear rhinorrhea as well as intermittent nonproductive cough and intermittent postnasal drip.  Over the past year, she reports continued chronic intermittent episodes of postnasal drip, chronic intermittent nonproductive cough-unchanged in frequency.  No other associated symptoms.    Over the past several months, the patient reports a history of frequent belching.  She also reports frequent episodes of a burning sensation in the throat.  She reports that the episodes can occur after oral intake but can also occur in the absence of oral intake.  She reports that the burning sensation will decrease after drinking milk.  She also reports over the past several months experiencing intermittent morning episodes of nausea which have been hours in duration.  She reports that the nausea sometimes improves with drinking milk or chamomile tea.  Also, over the past several months, she reports intermittent dysphagia for certain solid foods.  She reports no associated abdominal pain.  Over the past year, she still reports continued chronic variability in the frequency of bowel movements, continued chronic variability in consistency and shape of stools, continued chronic increased passage of flatus after eating-unchanged.  The patient is scheduled for an EGD on September 11.    The patient reports a history of cold intolerance this year.  She reports no associated symptoms.    Over the past several years, she reports intermittent episodes of a deep visceral pain located under the lateral surface of the right costal margin..  She reports that the duration of each episode can be hours or longer.  She reports no precipitating, exacerbating, relieving factors..  She reports  no other associated symptoms.    Over the past year, the patient reports fewer episodes of aching pain in the left upper chest region as she has been under much less stress.    Over the past year, she reports continued chronic dyspnea with cleaning and when walking up and down stairs-unchanged.  She reports no other associated symptoms.    Over the past year, the patient reports intermittent episodes of soreness extending from the midportion of the right upper arm to the proximal end of the right forearm.  She reports that the episodes occur with particular activities especially when she pulls the garage door.  She reports no other associated symptoms.  Over the past year, she reports no significant change in the frequency or intensity of the episodes    Over the past year, she reports continued chronic intermittent episodes of soreness in the left groin region.  She reports again that the episodes only occur when sitting Nigerian style.  She reports no radiation of discomfort and no other associated symptoms.  She reports a decrease in the frequency but no change in the intensity of the episodes as she rarely has been sitting [place] style.    Over the past year, the patient reports continued chronic generalized fatigue and chronic anorexia-unchanged.  She still reports no associated depressed mood, anxiety, decreased concentration, anhedonia.  No other new complaints.      Patient Care Team:  Scotty Flores MD as PCP - General (Internal Medicine)  Scotty Flores MD as PCP - [facility] Attributed Provider     Review of Systems  All systems have been reviewed and are normal except as previously noted  Objective   Vitals:  There were no vitals taken for this visit.      Physical Exam  Head - palpation revealed no tenderness over the maxillary or frontal sinuses.   Eyes - extraocular movements intact, pupils equal and reactive to light, fundi revealed good retinal color, no hemorrhages or exudates  Ears -palpation of the  pinnas and traguses revealed no tenderness. External auditory canals are narrow but not erythematous or swollen. TMs clear  Nose - turbinates not erythematous or swollen; no septal deviation noted.  Mouth -mild xerostomia noted.  Posterior pharynx is not erythematous or swollen. Tonsillar pillars appeared normal; no exudates..  Slight whitish discoloration noted over the dorsal surface of the tongue  Neck - no lymphadenopathy. Thyroid gland not enlarged. No bruits.  Breasts -no asymmetry or nipple discharge noted.  Palpation revealed no tenderness or masses, no axillary lymphadenopathy noted.  Lungs - clear to auscultation bilaterally.  Cardiac - rate normal, rhythm regular, no murmurs, no JVD.  Abdomen - cholecystectomy scar noted. Soft, nondistended, normal active bowel sounds. Palpation revealed no tenderness or masses.  Pulses - 2+ bilaterally except dorsalis pedis pulses in both feet-0  Extremities - no peripheral edema.  Musculoskeletal:  Chest - No erythema or swelling. Full range of motion in all directions of motion with no pain. Palpation revealed no tenderness or increase in warmth.  Right shoulder-no erythema or swelling.?  Positive Miguel sign.  Full range of motion in all directions of motion with no pain.  Palpation revealed no tenderness or increase in warmth.   Negative Neer's sign, negative impingement sign, negative Arnett sign, negative arm crossover test, negative empty can test, negative Yergason sign  Left hip-no erythema or swelling. Windshield wiper test negative. Full range of motion in all directions of motion with no pain. Palpation revealed no tenderness or increase in warmth     Neurologic  Mental status-alert and oriented x3   Cranial nerves-2 through 12 grossly intact, no visual field abnormalities  Motor-no pronator drift noted, strength-5/5 in all muscle groups tested, , no tremor noted.  No bradykinesia noted.  No rigidity noted.  Negative pull test  Sensory-Light touch sensation  fully intact  Pinprick sensation fully intact  Vibratory sensation fully intact  Cerebellar-no truncal ataxia, good coordination finger-nose testing,, good coordination heel-to-shin testing, normal rapid alternating movements  Romberg negative,moderately decreased coordination in tandem gait  Reflexes -ankle jerks 1+/4 bilaterally; left upper extremity 1+/4, all other reflexes tested 2+/4 bilaterally  Assessment & Plan  Hypertension, essential    Orders:    CBC and Auto Differential; Future    Comprehensive Metabolic Panel; Future    C-Reactive Protein, High Sensitivity; Future    Hemoglobin A1C; Future    Lipid Panel; Future    Thyroid Stimulating Hormone; Future    Vitamin B12; Future    Vitamin D 25-Hydroxy,Total (for eval of Vitamin D levels); Future    Hepatitis C antibody; Future    Hypercholesterolemia    Orders:    CBC and Auto Differential; Future    Comprehensive Metabolic Panel; Future    C-Reactive Protein, High Sensitivity; Future    Hemoglobin A1C; Future    Lipid Panel; Future    Thyroid Stimulating Hormone; Future    Vitamin B12; Future    Vitamin D 25-Hydroxy,Total (for eval of Vitamin D levels); Future    Hepatitis C antibody; Future    MARTINEZ (dyspnea on exertion)    Orders:    CBC and Auto Differential; Future    Comprehensive Metabolic Panel; Future    C-Reactive Protein, High Sensitivity; Future    Hemoglobin A1C; Future    Lipid Panel; Future    Thyroid Stimulating Hormone; Future    Vitamin B12; Future    Vitamin D 25-Hydroxy,Total (for eval of Vitamin D levels); Future    Hepatitis C antibody; Future    Routine general medical examination at health care facility    Orders:    CBC and Auto Differential; Future    Comprehensive Metabolic Panel; Future    C-Reactive Protein, High Sensitivity; Future    Hemoglobin A1C; Future    Lipid Panel; Future    Thyroid Stimulating Hormone; Future    Vitamin B12; Future    Vitamin D 25-Hydroxy,Total (for eval of Vitamin D levels); Future    Hepatitis C  antibody; Future    Prediabetes    Orders:    CBC and Auto Differential; Future    Comprehensive Metabolic Panel; Future    C-Reactive Protein, High Sensitivity; Future    Hemoglobin A1C; Future    Lipid Panel; Future    Thyroid Stimulating Hormone; Future    Vitamin B12; Future    Vitamin D 25-Hydroxy,Total (for eval of Vitamin D levels); Future    Hepatitis C antibody; Future    Vitamin D deficiency    Orders:    CBC and Auto Differential; Future    Comprehensive Metabolic Panel; Future    C-Reactive Protein, High Sensitivity; Future    Hemoglobin A1C; Future    Lipid Panel; Future    Thyroid Stimulating Hormone; Future    Vitamin B12; Future    Vitamin D 25-Hydroxy,Total (for eval of Vitamin D levels); Future    Hepatitis C antibody; Future    Diastolic dysfunction    Orders:    CBC and Auto Differential; Future    Comprehensive Metabolic Panel; Future    C-Reactive Protein, High Sensitivity; Future    Hemoglobin A1C; Future    Lipid Panel; Future    Thyroid Stimulating Hormone; Future    Vitamin B12; Future    Vitamin D 25-Hydroxy,Total (for eval of Vitamin D levels); Future    Hepatitis C antibody; Future    empagliflozin (Jardiance) 10 mg; Take 1 tablet (10 mg) by mouth once daily.    Xerostomia    Orders:    Anti-SSA; Future    Anti-SSB; Future            Assessment.  Chronic intermittent episodes of aching pain in the left upper chest region-may be secondary to anxiety, neuromuscular chest discomfort Long history of intermittent episodes of a deep visceral pain located lateral surface of the right costal margin-May be secondary to neuromuscular discomfort  Noncardiac chest pain  Chronic intermittent episodes of bilateral LE edema - unsure of etiology. May be secondary to lymphedema  Atherosclerotic calcifications in the aorta  Hypertension- blood pressure at goal today.  2 mm nodule left lower lobe..  Chronic dyspnea on exertion-occurring with more activities over the past year-May be secondary diastolic  dysfunction.  Obstructive lung disease is a possible etiology.  Lack of conditioning is a possible etiology.  Influenza February 2024  COVID-19 September 12, 2022, September 9, 2023  Constant aching pain in the forehead region and retro-orbital regions with associated rhinorrhea-May be secondary to viral syndrome, sinusitis, COVID-19-unlikely, migraine headache  Chronic intermittent nonproductive cough-unsure of etiology.  May be secondary to upper airway cough syndrome secondary to allergic rhinitis,  Chronic intermittent episodes of postnasal drip-May be secondary to structural abnormality, allergic rhinitis  Acute sinusitis June 25, 2024  Intermittent episodes of dysphagia-May be secondary to gastroesophageal spasm, esophageal stricture  Intermittent morning episodes of nausea-May be secondary to gastroesophageal reflux  Frequent episodes of a burning sensation in the throat, frequent belching-May be secondary to gastroesophageal reflux  Gastroesophageal reflux.  Chronic variability in the frequency of bowel movements-probably secondary to IBS-C  Chronic variability in consistency and shape of stools, increased passage of flatus after eating certain foods-may be secondary to IBS-C  Diverticulosis.  Hemorrhoids  Status post cholecystectomy-remote  Nephrolithiasis-recurrent.  Hypercalciuria  Chronic intermittent episodes of urinary urgency-may be secondary to overactive bladder  Overactive bladder  Status post total abdominal hysterectomy/bilateral salpingo-oophorectomy.  Pelvic organ prolapse  Fracture of right second toe, fracture of left third toe-remote   Chronic intermittent episodes of soreness extending from the midportion of the right upper arm to the proximal end of the right forearm-May be secondary to biceps tendon tear.  Chronic intermittent episodes of soreness in the left groin region-May be secondary to muscle strain, osteoarthritis of the left hip  Plantar fasciitis right heel  Shinsplints right  lower leg.  Osteopenia  Cold intolerance-unsure of etiology.  May be secondary to weight loss.  Anemia thyroid disease must be ruled out  Iritis left eye-recurrent  Borderline glaucoma of both eyes  Bilateral cataracts  Vitreous degeneration L eye.   Dry eye syndrome  Benign keratosis superior end of the left pinna-  Migraine headaches.  Small vessel ischemic changes  Mild central canal stenosis and mild bilateral neuroforaminal stenosis L4-L5  Right-sided neuroforaminal stenosis C6-C7.  Osteoarthritis and degenerative disc disease of the cervical spine  Chronic generalized fatigue-unsure of etiology.  Anemia and thyroid disease must be ruled out.  Chronic anorexia-probably secondary to depression and anxiety  Depression  Anxiety    Plan  Obtain CBC differential, CMP, fasting lipid profile, TSH, vitamin D level, vitamin B12 level, hepatitis C antibody, urinalysis,  today  Obtain anti-SSA, anti-SSB antibodies as well today.  Begin Jardiance 10 mg p.o. daily  Restart Zyrtec but at a dose of 5 mg daily and continue saline nasal spray 2 sprays to each nostril every 4-6 hours as needed.  Begin Advil 400 mg p.o. every 6 hours as needed or Tylenol 1000 mg p.o. every 6 hours as needed pain.  I also told the patient that she could apply Voltaren gel to painful regions every 6 hours as needed

## 2024-09-05 NOTE — ASSESSMENT & PLAN NOTE
Orders:    CBC and Auto Differential; Future    Comprehensive Metabolic Panel; Future    C-Reactive Protein, High Sensitivity; Future    Hemoglobin A1C; Future    Lipid Panel; Future    Thyroid Stimulating Hormone; Future    Vitamin B12; Future    Vitamin D 25-Hydroxy,Total (for eval of Vitamin D levels); Future    Hepatitis C antibody; Future

## 2024-09-05 NOTE — PROGRESS NOTES
Subjective     History of Present Illness:   Amanda Garcia is a 78 y.o. female who presents to GI clinic for burning up to throat.  for about 2 weeks after 30 days of omeprazole felt better then resumed.  Occasionally dysphagia.  Occasional burning in throat.  Frequent belching. Occasional lower chest/epigastric pain on awakening.  Review of Systems  Review of Systems    Social History   reports that she has never smoked. She has never used smokeless tobacco. She reports that she does not currently use alcohol.     Allergies  Allergies   Allergen Reactions    Penicillins Rash       Medications  Current Outpatient Medications   Medication Instructions    cholecalciferol (VITAMIN D-3) 2,000 Units, oral, Daily    hydroCHLOROthiazide (MICROZIDE) 12.5 mg, Every morning    pyridoxine (VITAMIN B-6) 100 mg, oral, Daily        Objective   Visit Vitals  Pulse 65      Physical Exam  Constitutional:       Appearance: Normal appearance.   HENT:      Mouth/Throat:      Mouth: Mucous membranes are moist.      Pharynx: Oropharynx is clear.   Eyes:      Extraocular Movements: Extraocular movements intact.      Pupils: Pupils are equal, round, and reactive to light.   Cardiovascular:      Rate and Rhythm: Normal rate and regular rhythm.      Heart sounds: No murmur heard.  Pulmonary:      Effort: Pulmonary effort is normal.      Breath sounds: Normal breath sounds.   Abdominal:      General: Abdomen is flat. Bowel sounds are normal.      Palpations: Abdomen is soft. There is no mass.   Musculoskeletal:      Right lower leg: Edema present.   Skin:     General: Skin is warm and dry.   Neurological:      Mental Status: She is alert.   Psychiatric:         Mood and Affect: Mood normal.         Behavior: Behavior normal.         Thought Content: Thought content normal.         Judgment: Judgment normal.               Assessment/Plan   Amanda Garcia is a 78 y.o. female who presents to GI clinic for intermittent throat burning and  occasional dysphagia. Symptoms suggestive of GERD and reflux induced spasms.  Less likely EoE.  Less likely neoplasia.    Plan:    EGD    Bahman Lee MD

## 2024-09-06 ENCOUNTER — APPOINTMENT (OUTPATIENT)
Dept: PRIMARY CARE | Facility: CLINIC | Age: 79
End: 2024-09-06
Payer: MEDICARE

## 2024-09-06 ENCOUNTER — LAB (OUTPATIENT)
Dept: LAB | Facility: LAB | Age: 79
End: 2024-09-06
Payer: MEDICARE

## 2024-09-06 VITALS
WEIGHT: 133.8 LBS | SYSTOLIC BLOOD PRESSURE: 110 MMHG | HEART RATE: 78 BPM | DIASTOLIC BLOOD PRESSURE: 70 MMHG | BODY MASS INDEX: 22.27 KG/M2

## 2024-09-06 DIAGNOSIS — R73.03 PREDIABETES: ICD-10-CM

## 2024-09-06 DIAGNOSIS — I51.89 DIASTOLIC DYSFUNCTION: ICD-10-CM

## 2024-09-06 DIAGNOSIS — Z00.00 ROUTINE GENERAL MEDICAL EXAMINATION AT HEALTH CARE FACILITY: Primary | ICD-10-CM

## 2024-09-06 DIAGNOSIS — K11.7 XEROSTOMIA: ICD-10-CM

## 2024-09-06 DIAGNOSIS — E55.9 VITAMIN D DEFICIENCY: ICD-10-CM

## 2024-09-06 DIAGNOSIS — I50.32 CHRONIC DIASTOLIC (CONGESTIVE) HEART FAILURE (MULTI): ICD-10-CM

## 2024-09-06 DIAGNOSIS — I10 HYPERTENSION, ESSENTIAL: ICD-10-CM

## 2024-09-06 DIAGNOSIS — E78.00 HYPERCHOLESTEROLEMIA: ICD-10-CM

## 2024-09-06 DIAGNOSIS — R82.90 ABNORMAL FINDING ON URINALYSIS: ICD-10-CM

## 2024-09-06 DIAGNOSIS — R06.09 DOE (DYSPNEA ON EXERTION): ICD-10-CM

## 2024-09-06 DIAGNOSIS — N20.0 NEPHROLITHIASIS: ICD-10-CM

## 2024-09-06 DIAGNOSIS — I51.89 DIASTOLIC DYSFUNCTION: Primary | ICD-10-CM

## 2024-09-06 DIAGNOSIS — Z00.00 ROUTINE GENERAL MEDICAL EXAMINATION AT HEALTH CARE FACILITY: ICD-10-CM

## 2024-09-06 LAB
25(OH)D3 SERPL-MCNC: 47 NG/ML (ref 30–100)
ALBUMIN SERPL BCP-MCNC: 4.6 G/DL (ref 3.4–5)
ALP SERPL-CCNC: 70 U/L (ref 33–136)
ALT SERPL W P-5'-P-CCNC: 17 U/L (ref 7–45)
ANION GAP SERPL CALC-SCNC: 13 MMOL/L (ref 10–20)
APPEARANCE UR: CLEAR
AST SERPL W P-5'-P-CCNC: 22 U/L (ref 9–39)
BASOPHILS # BLD AUTO: 0.07 X10*3/UL (ref 0–0.1)
BASOPHILS NFR BLD AUTO: 1.1 %
BILIRUB SERPL-MCNC: 0.7 MG/DL (ref 0–1.2)
BILIRUB UR STRIP.AUTO-MCNC: NEGATIVE MG/DL
BNP SERPL-MCNC: 12 PG/ML (ref 0–99)
BUN SERPL-MCNC: 13 MG/DL (ref 6–23)
CALCIUM SERPL-MCNC: 9.9 MG/DL (ref 8.6–10.6)
CHLORIDE SERPL-SCNC: 102 MMOL/L (ref 98–107)
CHOLEST SERPL-MCNC: 192 MG/DL (ref 0–199)
CHOLESTEROL/HDL RATIO: 2.7
CO2 SERPL-SCNC: 30 MMOL/L (ref 21–32)
COLOR UR: ABNORMAL
CREAT SERPL-MCNC: 0.65 MG/DL (ref 0.5–1.05)
CRP SERPL HS-MCNC: 1.3 MG/L
EGFRCR SERPLBLD CKD-EPI 2021: >90 ML/MIN/1.73M*2
ENA SS-A AB SER IA-ACNC: <0.2 AI
ENA SS-B AB SER IA-ACNC: <0.2 AI
EOSINOPHIL # BLD AUTO: 0.25 X10*3/UL (ref 0–0.4)
EOSINOPHIL NFR BLD AUTO: 3.9 %
ERYTHROCYTE [DISTWIDTH] IN BLOOD BY AUTOMATED COUNT: 11.7 % (ref 11.5–14.5)
EST. AVERAGE GLUCOSE BLD GHB EST-MCNC: 103 MG/DL
GLUCOSE SERPL-MCNC: 96 MG/DL (ref 74–99)
GLUCOSE UR STRIP.AUTO-MCNC: NORMAL MG/DL
HBA1C MFR BLD: 5.2 %
HCT VFR BLD AUTO: 41.9 % (ref 36–46)
HCV AB SER QL: NONREACTIVE
HDLC SERPL-MCNC: 69.9 MG/DL
HGB BLD-MCNC: 14.5 G/DL (ref 12–16)
HOLD SPECIMEN: NORMAL
IMM GRANULOCYTES # BLD AUTO: 0.01 X10*3/UL (ref 0–0.5)
IMM GRANULOCYTES NFR BLD AUTO: 0.2 % (ref 0–0.9)
KETONES UR STRIP.AUTO-MCNC: NEGATIVE MG/DL
LDLC SERPL CALC-MCNC: 109 MG/DL
LEUKOCYTE ESTERASE UR QL STRIP.AUTO: ABNORMAL
LYMPHOCYTES # BLD AUTO: 3.01 X10*3/UL (ref 0.8–3)
LYMPHOCYTES NFR BLD AUTO: 47.2 %
MCH RBC QN AUTO: 32.8 PG (ref 26–34)
MCHC RBC AUTO-ENTMCNC: 34.6 G/DL (ref 32–36)
MCV RBC AUTO: 95 FL (ref 80–100)
MONOCYTES # BLD AUTO: 0.54 X10*3/UL (ref 0.05–0.8)
MONOCYTES NFR BLD AUTO: 8.5 %
NEUTROPHILS # BLD AUTO: 2.5 X10*3/UL (ref 1.6–5.5)
NEUTROPHILS NFR BLD AUTO: 39.1 %
NITRITE UR QL STRIP.AUTO: NEGATIVE
NON HDL CHOLESTEROL: 122 MG/DL (ref 0–149)
NRBC BLD-RTO: 0 /100 WBCS (ref 0–0)
PH UR STRIP.AUTO: 6 [PH]
PLATELET # BLD AUTO: 243 X10*3/UL (ref 150–450)
POC APPEARANCE, URINE: CLEAR
POC BILIRUBIN, URINE: NEGATIVE
POC BLOOD, URINE: NEGATIVE
POC COLOR, URINE: YELLOW
POC GLUCOSE, URINE: NEGATIVE MG/DL
POC KETONES, URINE: NEGATIVE MG/DL
POC LEUKOCYTES, URINE: ABNORMAL
POC NITRITE,URINE: NEGATIVE
POC PH, URINE: 6 PH
POC PROTEIN, URINE: NEGATIVE MG/DL
POC SPECIFIC GRAVITY, URINE: 1.01
POC UROBILINOGEN, URINE: 0.2 EU/DL
POTASSIUM SERPL-SCNC: 4 MMOL/L (ref 3.5–5.3)
PROT SERPL-MCNC: 7.3 G/DL (ref 6.4–8.2)
PROT UR STRIP.AUTO-MCNC: NEGATIVE MG/DL
RBC # BLD AUTO: 4.42 X10*6/UL (ref 4–5.2)
RBC # UR STRIP.AUTO: NEGATIVE /UL
RBC #/AREA URNS AUTO: ABNORMAL /HPF
SODIUM SERPL-SCNC: 141 MMOL/L (ref 136–145)
SP GR UR STRIP.AUTO: 1.01
TRIGL SERPL-MCNC: 67 MG/DL (ref 0–149)
TSH SERPL-ACNC: 1.27 MIU/L (ref 0.44–3.98)
UROBILINOGEN UR STRIP.AUTO-MCNC: NORMAL MG/DL
VIT B12 SERPL-MCNC: 452 PG/ML (ref 211–911)
VLDL: 13 MG/DL (ref 0–40)
WBC # BLD AUTO: 6.4 X10*3/UL (ref 4.4–11.3)
WBC #/AREA URNS AUTO: ABNORMAL /HPF

## 2024-09-06 PROCEDURE — G0439 PPPS, SUBSEQ VISIT: HCPCS | Performed by: INTERNAL MEDICINE

## 2024-09-06 PROCEDURE — 80053 COMPREHEN METABOLIC PANEL: CPT

## 2024-09-06 PROCEDURE — 86141 C-REACTIVE PROTEIN HS: CPT

## 2024-09-06 PROCEDURE — 80061 LIPID PANEL: CPT

## 2024-09-06 PROCEDURE — 81002 URINALYSIS NONAUTO W/O SCOPE: CPT | Performed by: INTERNAL MEDICINE

## 2024-09-06 PROCEDURE — 1170F FXNL STATUS ASSESSED: CPT | Performed by: INTERNAL MEDICINE

## 2024-09-06 PROCEDURE — 82607 VITAMIN B-12: CPT

## 2024-09-06 PROCEDURE — 83036 HEMOGLOBIN GLYCOSYLATED A1C: CPT

## 2024-09-06 PROCEDURE — 84443 ASSAY THYROID STIM HORMONE: CPT

## 2024-09-06 PROCEDURE — 82306 VITAMIN D 25 HYDROXY: CPT

## 2024-09-06 PROCEDURE — 86235 NUCLEAR ANTIGEN ANTIBODY: CPT

## 2024-09-06 PROCEDURE — 99214 OFFICE O/P EST MOD 30 MIN: CPT | Performed by: INTERNAL MEDICINE

## 2024-09-06 PROCEDURE — 36415 COLL VENOUS BLD VENIPUNCTURE: CPT

## 2024-09-06 PROCEDURE — 83880 ASSAY OF NATRIURETIC PEPTIDE: CPT

## 2024-09-06 PROCEDURE — 87086 URINE CULTURE/COLONY COUNT: CPT

## 2024-09-06 PROCEDURE — 1160F RVW MEDS BY RX/DR IN RCRD: CPT | Performed by: INTERNAL MEDICINE

## 2024-09-06 PROCEDURE — 1159F MED LIST DOCD IN RCRD: CPT | Performed by: INTERNAL MEDICINE

## 2024-09-06 PROCEDURE — 85025 COMPLETE CBC W/AUTO DIFF WBC: CPT

## 2024-09-06 PROCEDURE — 3074F SYST BP LT 130 MM HG: CPT | Performed by: INTERNAL MEDICINE

## 2024-09-06 PROCEDURE — 3078F DIAST BP <80 MM HG: CPT | Performed by: INTERNAL MEDICINE

## 2024-09-06 PROCEDURE — 81001 URINALYSIS AUTO W/SCOPE: CPT

## 2024-09-06 PROCEDURE — 86803 HEPATITIS C AB TEST: CPT

## 2024-09-06 RX ORDER — PYRIDOXINE HCL (VITAMIN B6) 100 MG
50 TABLET ORAL DAILY
Qty: 90 TABLET | Refills: 1 | Status: SHIPPED | OUTPATIENT
Start: 2024-09-06

## 2024-09-06 ASSESSMENT — ACTIVITIES OF DAILY LIVING (ADL)
GROCERY_SHOPPING: INDEPENDENT
BATHING: INDEPENDENT
DOING_HOUSEWORK: INDEPENDENT
MANAGING_FINANCES: INDEPENDENT
DRESSING: INDEPENDENT
TAKING_MEDICATION: INDEPENDENT

## 2024-09-06 ASSESSMENT — ENCOUNTER SYMPTOMS
OCCASIONAL FEELINGS OF UNSTEADINESS: 0
LOSS OF SENSATION IN FEET: 0
DEPRESSION: 0

## 2024-09-08 LAB — BACTERIA UR CULT: NORMAL

## 2024-09-10 DIAGNOSIS — I51.89 DIASTOLIC DYSFUNCTION: ICD-10-CM

## 2024-09-10 DIAGNOSIS — I51.89 DIASTOLIC DYSFUNCTION: Primary | ICD-10-CM

## 2024-09-10 RX ORDER — DAPAGLIFLOZIN 10 MG/1
10 TABLET, FILM COATED ORAL DAILY
Qty: 30 TABLET | Refills: 3 | Status: SHIPPED | OUTPATIENT
Start: 2024-09-10 | End: 2025-01-08

## 2024-09-10 RX ORDER — DAPAGLIFLOZIN 5 MG/1
5 TABLET, FILM COATED ORAL DAILY
Qty: 30 TABLET | Refills: 3 | Status: SHIPPED | OUTPATIENT
Start: 2024-09-10 | End: 2024-09-10 | Stop reason: DRUGHIGH

## 2024-09-11 ENCOUNTER — APPOINTMENT (OUTPATIENT)
Dept: GASTROENTEROLOGY | Facility: EXTERNAL LOCATION | Age: 79
End: 2024-09-11
Payer: MEDICARE

## 2024-09-11 DIAGNOSIS — R13.10 DYSPHAGIA, UNSPECIFIED: ICD-10-CM

## 2024-09-11 DIAGNOSIS — R13.14 PHARYNGOESOPHAGEAL DYSPHAGIA: Primary | ICD-10-CM

## 2024-09-11 PROCEDURE — 43239 EGD BIOPSY SINGLE/MULTIPLE: CPT | Performed by: INTERNAL MEDICINE

## 2024-09-26 DIAGNOSIS — K20.0 EOSINOPHILIC ESOPHAGITIS: Primary | ICD-10-CM

## 2024-09-26 RX ORDER — LANSOPRAZOLE 30 MG/1
30 CAPSULE, DELAYED RELEASE ORAL DAILY
Qty: 90 CAPSULE | Refills: 3 | Status: SHIPPED | OUTPATIENT
Start: 2024-09-26 | End: 2025-09-26

## 2024-10-17 ENCOUNTER — LAB (OUTPATIENT)
Dept: LAB | Facility: LAB | Age: 79
End: 2024-10-17
Payer: COMMERCIAL

## 2024-10-17 ENCOUNTER — APPOINTMENT (OUTPATIENT)
Dept: GASTROENTEROLOGY | Facility: CLINIC | Age: 79
End: 2024-10-17
Payer: MEDICARE

## 2024-10-17 VITALS — HEART RATE: 80 BPM | WEIGHT: 135 LBS | HEIGHT: 65 IN | BODY MASS INDEX: 22.49 KG/M2

## 2024-10-17 DIAGNOSIS — F32.A FATIGUE DUE TO DEPRESSION: ICD-10-CM

## 2024-10-17 DIAGNOSIS — R53.83 FATIGUE DUE TO DEPRESSION: ICD-10-CM

## 2024-10-17 DIAGNOSIS — R53.83 FATIGUE DUE TO DEPRESSION: Primary | ICD-10-CM

## 2024-10-17 DIAGNOSIS — F32.A FATIGUE DUE TO DEPRESSION: Primary | ICD-10-CM

## 2024-10-17 DIAGNOSIS — R53.83 FATIGUE, UNSPECIFIED TYPE: ICD-10-CM

## 2024-10-17 DIAGNOSIS — K21.9 GASTROESOPHAGEAL REFLUX DISEASE, UNSPECIFIED WHETHER ESOPHAGITIS PRESENT: ICD-10-CM

## 2024-10-17 PROCEDURE — 86140 C-REACTIVE PROTEIN: CPT

## 2024-10-17 PROCEDURE — 36415 COLL VENOUS BLD VENIPUNCTURE: CPT

## 2024-10-17 PROCEDURE — 80053 COMPREHEN METABOLIC PANEL: CPT

## 2024-10-17 PROCEDURE — 1160F RVW MEDS BY RX/DR IN RCRD: CPT | Performed by: INTERNAL MEDICINE

## 2024-10-17 PROCEDURE — 83735 ASSAY OF MAGNESIUM: CPT

## 2024-10-17 PROCEDURE — 82746 ASSAY OF FOLIC ACID SERUM: CPT

## 2024-10-17 PROCEDURE — 1159F MED LIST DOCD IN RCRD: CPT | Performed by: INTERNAL MEDICINE

## 2024-10-17 PROCEDURE — 85027 COMPLETE CBC AUTOMATED: CPT

## 2024-10-17 PROCEDURE — 99214 OFFICE O/P EST MOD 30 MIN: CPT | Performed by: INTERNAL MEDICINE

## 2024-10-17 NOTE — PROGRESS NOTES
Subjective     History of Present Illness:   Amanda Garcia is a 78 y.o. female who presents to GI clinic for no longer swallowing difficulty but:  Tired, sometimes dizzy,   Tingling in feet, muscles ache  Feels it's all from the     Review of Systems  Review of Systems    Social History   reports that she has never smoked. She has never used smokeless tobacco. She reports that she does not currently use alcohol.     Allergies  Allergies   Allergen Reactions    Penicillins Rash       Medications  Current Outpatient Medications   Medication Instructions    cholecalciferol (VITAMIN D-3) 2,000 Units, Daily    dapagliflozin propanediol (FARXIGA) 10 mg, oral, Daily    empagliflozin (JARDIANCE) 10 mg, oral, Daily    hydroCHLOROthiazide (MICROZIDE) 12.5 mg, Every morning    lansoprazole (PREVACID) 30 mg, oral, Daily, Do not crush or chew.    pyridoxine (VITAMIN B-6) 50 mg, oral, Daily        Objective   Visit Vitals  Pulse 80      Physical Exam  Constitutional:       Appearance: Normal appearance. She is normal weight.   HENT:      Head: Normocephalic and atraumatic.      Nose: Nose normal.      Mouth/Throat:      Mouth: Mucous membranes are moist.      Pharynx: Oropharynx is clear.   Eyes:      Extraocular Movements: Extraocular movements intact.      Pupils: Pupils are equal, round, and reactive to light.   Neurological:      Mental Status: She is alert.   Psychiatric:         Mood and Affect: Mood normal.         Behavior: Behavior normal.         Thought Content: Thought content normal.         Judgment: Judgment normal.                       Assessment/Plan   Amanda Garcia is a 78 y.o. female who presents to GI clinic for dyphagia resolved with the lansoprazole 30 mg but feels she's having multiple side effects, though none are very common with PPIs (and she felt fine with previous omeprazole.)    Will change to omeprazole 20 daily  If less effective for the dysphagia, possible lansoprazole 15 daily  Check: CBC, CMP,  CRP, Mg2+.      Bamhan Lee MD

## 2024-10-18 LAB
ALBUMIN SERPL BCP-MCNC: 4.8 G/DL (ref 3.4–5)
ALP SERPL-CCNC: 69 U/L (ref 33–136)
ALT SERPL W P-5'-P-CCNC: 13 U/L (ref 7–45)
ANION GAP SERPL CALC-SCNC: 15 MMOL/L (ref 10–20)
AST SERPL W P-5'-P-CCNC: 22 U/L (ref 9–39)
BILIRUB SERPL-MCNC: 0.5 MG/DL (ref 0–1.2)
BUN SERPL-MCNC: 14 MG/DL (ref 6–23)
CALCIUM SERPL-MCNC: 10.2 MG/DL (ref 8.6–10.6)
CHLORIDE SERPL-SCNC: 102 MMOL/L (ref 98–107)
CO2 SERPL-SCNC: 28 MMOL/L (ref 21–32)
CREAT SERPL-MCNC: 0.64 MG/DL (ref 0.5–1.05)
CRP SERPL-MCNC: 0.23 MG/DL
EGFRCR SERPLBLD CKD-EPI 2021: >90 ML/MIN/1.73M*2
ERYTHROCYTE [DISTWIDTH] IN BLOOD BY AUTOMATED COUNT: 11.8 % (ref 11.5–14.5)
FOLATE SERPL-MCNC: 17.9 NG/ML
GLUCOSE SERPL-MCNC: 87 MG/DL (ref 74–99)
HCT VFR BLD AUTO: 43.3 % (ref 36–46)
HGB BLD-MCNC: 14.7 G/DL (ref 12–16)
MAGNESIUM SERPL-MCNC: 2.22 MG/DL (ref 1.6–2.4)
MCH RBC QN AUTO: 33 PG (ref 26–34)
MCHC RBC AUTO-ENTMCNC: 33.9 G/DL (ref 32–36)
MCV RBC AUTO: 97 FL (ref 80–100)
NRBC BLD-RTO: 0 /100 WBCS (ref 0–0)
PLATELET # BLD AUTO: 244 X10*3/UL (ref 150–450)
POTASSIUM SERPL-SCNC: 4.2 MMOL/L (ref 3.5–5.3)
PROT SERPL-MCNC: 7.2 G/DL (ref 6.4–8.2)
RBC # BLD AUTO: 4.45 X10*6/UL (ref 4–5.2)
SODIUM SERPL-SCNC: 141 MMOL/L (ref 136–145)
WBC # BLD AUTO: 9.8 X10*3/UL (ref 4.4–11.3)

## 2025-01-17 ENCOUNTER — APPOINTMENT (OUTPATIENT)
Dept: ALLERGY | Facility: CLINIC | Age: 80
End: 2025-01-17
Payer: MEDICARE

## 2025-02-11 ENCOUNTER — APPOINTMENT (OUTPATIENT)
Dept: ALLERGY | Facility: CLINIC | Age: 80
End: 2025-02-11
Payer: MEDICARE

## 2025-05-27 ENCOUNTER — OFFICE VISIT (OUTPATIENT)
Dept: URGENT CARE | Age: 80
End: 2025-05-27
Payer: COMMERCIAL

## 2025-05-27 ENCOUNTER — ANCILLARY PROCEDURE (OUTPATIENT)
Dept: URGENT CARE | Age: 80
End: 2025-05-27
Payer: COMMERCIAL

## 2025-05-27 VITALS
HEART RATE: 78 BPM | HEIGHT: 65 IN | WEIGHT: 132 LBS | OXYGEN SATURATION: 98 % | DIASTOLIC BLOOD PRESSURE: 83 MMHG | BODY MASS INDEX: 21.99 KG/M2 | RESPIRATION RATE: 19 BRPM | SYSTOLIC BLOOD PRESSURE: 158 MMHG | TEMPERATURE: 97.8 F

## 2025-05-27 DIAGNOSIS — R06.02 SHORTNESS OF BREATH: ICD-10-CM

## 2025-05-27 DIAGNOSIS — R07.89 CHEST TIGHTNESS: Primary | ICD-10-CM

## 2025-05-27 DIAGNOSIS — M54.89 OTHER ACUTE BACK PAIN: ICD-10-CM

## 2025-05-27 DIAGNOSIS — Z87.442 HISTORY OF KIDNEY STONES: ICD-10-CM

## 2025-05-27 LAB
POC APPEARANCE, URINE: CLEAR
POC BILIRUBIN, URINE: NEGATIVE
POC BLOOD, URINE: NEGATIVE
POC COLOR, URINE: NORMAL
POC GLUCOSE, URINE: NEGATIVE MG/DL
POC KETONES, URINE: NEGATIVE MG/DL
POC LEUKOCYTES, URINE: NEGATIVE
POC NITRITE,URINE: NEGATIVE
POC PH, URINE: 6 PH
POC PROTEIN, URINE: NEGATIVE MG/DL
POC SPECIFIC GRAVITY, URINE: <=1.005
POC UROBILINOGEN, URINE: 0.2 EU/DL

## 2025-05-27 PROCEDURE — 71046 X-RAY EXAM CHEST 2 VIEWS: CPT | Performed by: STUDENT IN AN ORGANIZED HEALTH CARE EDUCATION/TRAINING PROGRAM

## 2025-05-27 RX ORDER — POTASSIUM CITRATE 540 MG/1
5 TABLET, EXTENDED RELEASE ORAL DAILY
COMMUNITY

## 2025-05-27 ASSESSMENT — ENCOUNTER SYMPTOMS
SHORTNESS OF BREATH: 1
BACK PAIN: 1
CHEST TIGHTNESS: 1
NAUSEA: 1

## 2025-05-27 NOTE — PROGRESS NOTES
"Subjective   Patient ID: Amanda Garcia is a 79 y.o. female. They present today with a chief complaint of Back Pain (Mid-back pain since 5/16, more left sided. Burning like pain, increases with inhale. Hx kidney stones.).    History of Present Illness  Pt presents with left upper back pain. Started 5/16/25. Pain located underneath bra strap. States it is a burning pain, feels like a nerve almost. Hurts to lie back or put pressure on the area and also with deep breaths. No trauma, fall or heavy lifting. No hx of similar. Associated chest tightness and shortness of  breath. She does have hx of kidney stones but current pain does not feel similar. There is associated nausea. She denies fever chills cough/other uri sx dizziness abd pain vomiting diarrhea constipation urinary sx blood in the stool or urine leg swelling syncope rashes. Has has tied heat/cold/salon pas without relief.       History provided by:  Patient      Past Medical History  Allergies as of 05/27/2025 - Reviewed 05/27/2025   Allergen Reaction Noted    Penicillins Rash 06/26/2023       Prescriptions Prior to Admission[1]     Medical History[2]    Surgical History[3]     reports that she has never smoked. She has never used smokeless tobacco. She reports that she does not currently use alcohol.    Review of Systems  Review of Systems   Respiratory:  Positive for chest tightness and shortness of breath.    Gastrointestinal:  Positive for nausea.   Musculoskeletal:  Positive for back pain.   All other systems reviewed and are negative.                                 Objective    Vitals:    05/27/25 1236   BP: 158/83   BP Location: Right arm   Patient Position: Sitting   BP Cuff Size: Adult   Pulse: 78   Resp: 19   Temp: 36.6 °C (97.8 °F)   TempSrc: Oral   SpO2: 98%   Weight: 59.9 kg (132 lb)   Height: 1.651 m (5' 5\")     No LMP recorded. Patient is postmenopausal.    Physical Exam  Vitals and nursing note reviewed.   Constitutional:       General: She is " not in acute distress.     Appearance: Normal appearance. She is not ill-appearing, toxic-appearing or diaphoretic.   HENT:      Head: Normocephalic and atraumatic.      Right Ear: Tympanic membrane, ear canal and external ear normal.      Left Ear: Tympanic membrane, ear canal and external ear normal.      Nose: Nose normal.      Mouth/Throat:      Mouth: Mucous membranes are moist.   Cardiovascular:      Rate and Rhythm: Normal rate and regular rhythm.      Pulses: Normal pulses.      Heart sounds: No murmur heard.  Pulmonary:      Effort: Pulmonary effort is normal. No respiratory distress.      Breath sounds: No wheezing, rhonchi or rales.      Comments: Pt does appear to have some mild conversational dyspnea    Abdominal:      General: Bowel sounds are normal. There is no distension.      Palpations: Abdomen is soft. There is no mass.      Tenderness: There is no abdominal tenderness. There is no right CVA tenderness, left CVA tenderness, guarding or rebound.   Musculoskeletal:      Cervical back: Normal.      Thoracic back: Normal.      Lumbar back: Normal.      Right lower leg: No edema.      Left lower leg: No edema.   Skin:     Findings: No rash.   Neurological:      General: No focal deficit present.      Mental Status: She is alert.         Procedures    Point of Care Test & Imaging Results from this visit  Results for orders placed or performed in visit on 05/27/25   POCT UA Automated manually resulted   Result Value Ref Range    POC Color, Urine Light-Yellow Straw, Yellow, Light-Yellow    POC Appearance, Urine Clear Clear    POC Glucose, Urine NEGATIVE NEGATIVE mg/dl    POC Bilirubin, Urine NEGATIVE NEGATIVE    POC Ketones, Urine NEGATIVE NEGATIVE mg/dl    POC Specific Gravity, Urine <=1.005 1.005 - 1.035    POC Blood, Urine NEGATIVE NEGATIVE    POC PH, Urine 6.0 No Reference Range Established PH    POC Protein, Urine NEGATIVE NEGATIVE mg/dl    POC Urobilinogen, Urine 0.2 0.2, 1.0 EU/DL    Poc Nitrite,  Urine NEGATIVE NEGATIVE    POC Leukocytes, Urine NEGATIVE NEGATIVE      Imaging  XR chest 2 views  Result Date: 5/27/2025  No radiographic evidence of acute cardiopulmonary abnormality.     MACRO: None   Signed by: Jake Pena 5/27/2025 1:40 PM Dictation workstation:   [id number]      Cardiology, Vascular, and Other Imaging  ECG 12 Lead  Result Date: 5/27/2025  Nsr hr 72. PACs. No acute ischemic changes. EKG reviewed with supervising Dr. Phillips.         Diagnostic study results (if any) were reviewed by Jen Reid PA-C.    Assessment/Plan   Allergies, medications, history, and pertinent labs/EKGs/Imaging reviewed by Jen Reid PA-C.     Medical Decision Making    Due to the medical service (s) limitations of the Urgent care, the Patient is being recommended for a higher level of medical evaluation in the emergency department. Provider has discussed the potential differential diagnoses and reasons for a higher level of evaluation due to the possibility of needing radiology testing, blood work, cardiac testing, and or IV fluids  as some examples. Ddx at this time includes but not limited to: ACS, PE, pleurisy, zoster sine herpete, costochondritis, MSK pain, CHF (hx of diastolic dysfunction), pericarditis, nephrolithiasis, UTI, pyelonephritis. Patient Verbalizes Understanding and is agreeable to the plan. Patient stated will go to ER at [facility] via [facility]. Declined EMS. Patient is alert and oriented X 3, steady gait, ABCs intact, no acute distress is noted. Advised of importance of evaluation and risks of not seeking further evaluation. Case D/w supervising Dr. Phillips.     Orders and Diagnoses  Diagnoses and all orders for this visit:  Chest tightness  Shortness of breath  Other acute back pain  -     POCT UA Automated manually resulted  -     XR chest 2 views; Future  -     ECG 12 Lead  History of kidney stones  -     POCT UA Automated manually resulted      Medical Admin Record      Patient  disposition: ED    Electronically signed by Jen Reid PA-C  4:24 PM           [1] (Not in a hospital admission)   [2]   Past Medical History:  Diagnosis Date    Encounter for screening for osteoporosis     Encounter for screening for osteoporosis    Encounter for screening mammogram for malignant neoplasm of breast     Visit for screening mammogram    Personal history of other diseases of the circulatory system 08/28/2020    History of hypertension    Personal history of other diseases of the female genital tract 08/22/2017    History of uterine prolapse   [3]   Past Surgical History:  Procedure Laterality Date    COLONOSCOPY  03/14/2013    Complete Colonoscopy    MR HEAD ANGIO WO IV CONTRAST  6/28/2023    MR HEAD ANGIO WO IV CONTRAST 6/28/2023 [facility] [id number] MRI    TOTAL ABDOMINAL HYSTERECTOMY  03/14/2013    Total Abdominal Hysterectomy

## 2025-05-28 DIAGNOSIS — S29.019A THORACIC MYOFASCIAL STRAIN, INITIAL ENCOUNTER: Primary | ICD-10-CM

## 2025-05-28 RX ORDER — CYCLOBENZAPRINE HCL 5 MG
5 TABLET ORAL 3 TIMES DAILY PRN
Qty: 30 TABLET | Refills: 2 | Status: SHIPPED | OUTPATIENT
Start: 2025-05-28 | End: 2026-01-23

## 2025-05-28 ASSESSMENT — ENCOUNTER SYMPTOMS
TINGLING: 0
PARESTHESIAS: 0
BACK PAIN: 1

## 2025-06-01 ASSESSMENT — ENCOUNTER SYMPTOMS
PARESTHESIAS: 0
BACK PAIN: 1
TINGLING: 0

## 2025-06-01 NOTE — PROGRESS NOTES
Subjective   Patient ID: Amanda Garcia is a 79 y.o. female who presents for No chief complaint on file..    Back Pain  This is a recurrent problem. The current episode started 1 to 4 weeks ago. The problem occurs intermittently. The problem is unchanged. The pain is present in the thoracic spine. The quality of the pain is described as aching, burning and stabbing. The pain does not radiate. The pain is at a severity of 10/10. The pain is The same all the time. The symptoms are aggravated by position, lying down, sitting and standing. Stiffness is present All day. Pertinent negatives include no paresthesias or tingling.        Review of Systems   Musculoskeletal:  Positive for back pain.   Neurological:  Negative for tingling and paresthesias.       Objective   There were no vitals taken for this visit.    Physical Exam    Assessment/Plan   {Assess/PlanSmartLinks:57467}        spine-no erythema or swelling.  Full range of motion with increased intensity of pain in the left mid thoracic region on bending to the right.  Full range of motion with no increased intensity of pain in all other directions of motion.  Palpation did reveal increased tenderness just lateral to the paraspinal area left mid thoracic region, no increase in warmth  Assessment/Plan   Problem List Items Addressed This Visit    None  Visit Diagnoses         Codes      Acute left-sided thoracic back pain    -  Primary M54.6    Relevant Orders    MR thoracic spine wo IV contrast      Hypokalemia     E87.6    Relevant Orders    Basic Metabolic Panel             Assessment  Hypokalemia-may be secondary to inadequate supplementation of potassium in the setting of administration of hydrochlorothiazide  Constant sharp burning pain left mid thoracic region-May be secondary to thoracic strain, osteoarthritis and degenerative disc disease of the thoracic spine, thoracic radiculopathy-unlikely  Plan  Obtain BMP today.  Obtain MRI of the thoracic spine ASAP.  I told the patient to begin use of Tylenol 1000 mg plus cyclobenzaprine 5 mg p.o. 3 times per day.  I recommended that she apply Voltaren gel to the left mid thoracic region every 6 hours as needed.  The patient will contact me in 10 days with her condition, she may be a good candidate for physical therapy referral

## 2025-06-03 ENCOUNTER — APPOINTMENT (OUTPATIENT)
Dept: PRIMARY CARE | Facility: CLINIC | Age: 80
End: 2025-06-03
Payer: MEDICARE

## 2025-06-03 VITALS
DIASTOLIC BLOOD PRESSURE: 72 MMHG | WEIGHT: 130 LBS | TEMPERATURE: 97.4 F | SYSTOLIC BLOOD PRESSURE: 110 MMHG | BODY MASS INDEX: 21.63 KG/M2 | HEART RATE: 68 BPM

## 2025-06-03 DIAGNOSIS — E87.6 HYPOKALEMIA: ICD-10-CM

## 2025-06-03 DIAGNOSIS — M54.6 ACUTE LEFT-SIDED THORACIC BACK PAIN: Primary | ICD-10-CM

## 2025-06-03 LAB
ANION GAP SERPL CALCULATED.4IONS-SCNC: 10 MMOL/L (CALC) (ref 7–17)
BUN SERPL-MCNC: 17 MG/DL (ref 7–25)
BUN/CREAT SERPL: NORMAL (CALC) (ref 6–22)
CALCIUM SERPL-MCNC: 9.9 MG/DL (ref 8.6–10.4)
CHLORIDE SERPL-SCNC: 101 MMOL/L (ref 98–110)
CO2 SERPL-SCNC: 29 MMOL/L (ref 20–32)
CREAT SERPL-MCNC: 0.7 MG/DL (ref 0.6–1)
EGFRCR SERPLBLD CKD-EPI 2021: 88 ML/MIN/1.73M2
GLUCOSE SERPL-MCNC: 99 MG/DL (ref 65–99)
POTASSIUM SERPL-SCNC: 4.4 MMOL/L (ref 3.5–5.3)
SODIUM SERPL-SCNC: 140 MMOL/L (ref 135–146)

## 2025-06-03 PROCEDURE — 1125F AMNT PAIN NOTED PAIN PRSNT: CPT | Performed by: INTERNAL MEDICINE

## 2025-06-03 PROCEDURE — 1159F MED LIST DOCD IN RCRD: CPT | Performed by: INTERNAL MEDICINE

## 2025-06-03 PROCEDURE — 99213 OFFICE O/P EST LOW 20 MIN: CPT | Performed by: INTERNAL MEDICINE

## 2025-06-03 PROCEDURE — 3078F DIAST BP <80 MM HG: CPT | Performed by: INTERNAL MEDICINE

## 2025-06-03 PROCEDURE — 3074F SYST BP LT 130 MM HG: CPT | Performed by: INTERNAL MEDICINE

## 2025-06-03 PROCEDURE — 1036F TOBACCO NON-USER: CPT | Performed by: INTERNAL MEDICINE

## 2025-06-03 PROCEDURE — 1160F RVW MEDS BY RX/DR IN RCRD: CPT | Performed by: INTERNAL MEDICINE

## 2025-06-03 RX ORDER — HYDROCHLOROTHIAZIDE 12.5 MG/1
12.5 CAPSULE ORAL 2 TIMES DAILY
Qty: 60 CAPSULE | Refills: 1 | Status: SHIPPED | OUTPATIENT
Start: 2025-06-03

## 2025-06-03 ASSESSMENT — PAIN SCALES - GENERAL: PAINLEVEL_OUTOF10: 8

## 2025-06-05 ENCOUNTER — HOSPITAL ENCOUNTER (OUTPATIENT)
Dept: RADIOLOGY | Facility: CLINIC | Age: 80
Discharge: HOME | End: 2025-06-05
Payer: MEDICARE

## 2025-06-05 DIAGNOSIS — M54.6 ACUTE LEFT-SIDED THORACIC BACK PAIN: ICD-10-CM

## 2025-06-05 PROCEDURE — 72146 MRI CHEST SPINE W/O DYE: CPT

## 2025-08-12 DIAGNOSIS — Z12.31 ENCOUNTER FOR SCREENING MAMMOGRAM FOR MALIGNANT NEOPLASM OF BREAST: ICD-10-CM

## 2025-08-12 DIAGNOSIS — Z12.39 BREAST SCREENING: Primary | ICD-10-CM

## 2025-08-13 ENCOUNTER — HOSPITAL ENCOUNTER (OUTPATIENT)
Dept: RADIOLOGY | Facility: CLINIC | Age: 80
Discharge: HOME | End: 2025-08-13
Payer: MEDICARE

## 2025-08-13 VITALS — WEIGHT: 130.07 LBS | BODY MASS INDEX: 21.67 KG/M2 | HEIGHT: 65 IN

## 2025-08-13 DIAGNOSIS — Z12.31 ENCOUNTER FOR SCREENING MAMMOGRAM FOR MALIGNANT NEOPLASM OF BREAST: ICD-10-CM

## 2025-08-13 DIAGNOSIS — Z12.39 BREAST SCREENING: ICD-10-CM

## 2025-08-13 PROCEDURE — 77063 BREAST TOMOSYNTHESIS BI: CPT | Performed by: STUDENT IN AN ORGANIZED HEALTH CARE EDUCATION/TRAINING PROGRAM

## 2025-08-13 PROCEDURE — 77067 SCR MAMMO BI INCL CAD: CPT

## 2025-08-13 PROCEDURE — 77067 SCR MAMMO BI INCL CAD: CPT | Performed by: STUDENT IN AN ORGANIZED HEALTH CARE EDUCATION/TRAINING PROGRAM

## 2025-09-22 ENCOUNTER — APPOINTMENT (OUTPATIENT)
Dept: PRIMARY CARE | Facility: CLINIC | Age: 80
End: 2025-09-22
Payer: MEDICARE